# Patient Record
Sex: MALE | Race: WHITE | NOT HISPANIC OR LATINO | URBAN - METROPOLITAN AREA
[De-identification: names, ages, dates, MRNs, and addresses within clinical notes are randomized per-mention and may not be internally consistent; named-entity substitution may affect disease eponyms.]

---

## 2018-09-30 LAB — NONINV COLON CA DNA+OCC BLD SCRN STL QL: NEGATIVE

## 2019-03-26 ENCOUNTER — HISTORICAL (OUTPATIENT)
Dept: ADMINISTRATIVE | Facility: HOSPITAL | Age: 64
End: 2019-03-26

## 2019-03-26 LAB
ALBUMIN SERPL-MCNC: 4.3 G/DL (ref 3.6–4.8)
ALBUMIN/GLOB SERPL: 1.2 {RATIO} (ref 1.2–2.2)
ALP SERPL-CCNC: 88 IU/L (ref 39–117)
ALT SERPL-CCNC: 13 IU/L (ref 0–44)
AST SERPL-CCNC: 17 IU/L (ref 0–40)
BILIRUB SERPL-MCNC: 0.5 MG/DL (ref 0–1.2)
BUN SERPL-MCNC: 14 MG/DL (ref 8–27)
CALCIUM SERPL-MCNC: 9.8 MG/DL (ref 8.6–10.2)
CHLORIDE SERPL-SCNC: 102 MMOL/L (ref 96–106)
CO2 SERPL-SCNC: 26 MMOL/L (ref 20–29)
CREAT SERPL-MCNC: 1.2 MG/DL (ref 0.76–1.27)
CREAT/UREA NIT SERPL: 12 (ref 10–24)
GLOBULIN SER-MCNC: 3.7 G/DL (ref 1.5–4.5)
GLUCOSE SERPL-MCNC: 98 MG/DL (ref 65–99)
POTASSIUM SERPL-SCNC: 4.8 MMOL/L (ref 3.5–5.2)
PROT SERPL-MCNC: 8 G/DL (ref 6–8.5)
SODIUM SERPL-SCNC: 147 MMOL/L (ref 134–144)
URATE SERPL-MCNC: 5.1 MG/DL (ref 3.7–8.6)

## 2019-11-19 ENCOUNTER — HISTORICAL (OUTPATIENT)
Dept: ADMINISTRATIVE | Facility: HOSPITAL | Age: 64
End: 2019-11-19

## 2019-11-19 ENCOUNTER — HISTORICAL (OUTPATIENT)
Dept: LAB | Facility: HOSPITAL | Age: 64
End: 2019-11-19

## 2019-11-19 LAB
ALBUMIN SERPL-MCNC: 4.5 G/DL (ref 3.6–4.8)
ALBUMIN/GLOB SERPL: 1.5 {RATIO} (ref 1.2–2.2)
ALP SERPL-CCNC: 87 IU/L (ref 39–117)
ALT SERPL-CCNC: 14 IU/L (ref 0–44)
AST SERPL-CCNC: 18 IU/L (ref 0–40)
BASOPHILS # BLD AUTO: 0.1 X10E3/UL (ref 0–0.2)
BASOPHILS NFR BLD AUTO: 1 %
BILIRUB SERPL-MCNC: 0.5 MG/DL (ref 0–1.2)
BUN SERPL-MCNC: 16 MG/DL (ref 8–27)
CALCIUM SERPL-MCNC: 9.1 MG/DL (ref 8.6–10.2)
CHLORIDE SERPL-SCNC: 100 MMOL/L (ref 96–106)
CHOLEST SERPL-MCNC: 141 MG/DL (ref 100–199)
CHOLEST/HDLC SERPL: 3.4 RATIO (ref 0–5)
CO2 SERPL-SCNC: 23 MMOL/L (ref 20–29)
CREAT SERPL-MCNC: 1.33 MG/DL (ref 0.76–1.27)
CREAT/UREA NIT SERPL: 12 (ref 10–24)
DEPRECATED CALCIDIOL+CALCIFEROL SERPL-MC: 27.7 NG/ML (ref 30–100)
EOSINOPHIL # BLD AUTO: 0.2 X10E3/UL (ref 0–0.4)
EOSINOPHIL NFR BLD AUTO: 2 %
ERYTHROCYTE [DISTWIDTH] IN BLOOD BY AUTOMATED COUNT: 13.2 % (ref 12.3–15.4)
GLOBULIN SER-MCNC: 3 G/DL (ref 1.5–4.5)
GLUCOSE SERPL-MCNC: 97 MG/DL (ref 65–99)
HBA1C MFR BLD: 5.2 % (ref 4.8–5.6)
HCT VFR BLD AUTO: 46.3 % (ref 37.5–51)
HDLC SERPL-MCNC: 41 MG/DL
HGB BLD-MCNC: 15 G/DL (ref 13–17.7)
LDLC SERPL CALC-MCNC: 78 MG/DL (ref 0–99)
LYMPHOCYTES # BLD AUTO: 3.1 X10E3/UL (ref 0.7–3.1)
LYMPHOCYTES NFR BLD AUTO: 38 %
MCH RBC QN AUTO: 31.4 PG (ref 26.6–33)
MCHC RBC AUTO-ENTMCNC: 32.4 G/DL (ref 31.5–35.7)
MCV RBC AUTO: 97 FL (ref 79–97)
MONOCYTES # BLD AUTO: 0.4 X10E3/UL (ref 0.1–0.9)
MONOCYTES NFR BLD AUTO: 5 %
NEUTROPHILS # BLD AUTO: 4.3 X10E3/UL (ref 1.4–7)
NEUTROPHILS NFR BLD AUTO: 54 %
PLATELET # BLD AUTO: 183 X10E3/UL (ref 150–450)
POTASSIUM SERPL-SCNC: 4.5 MMOL/L (ref 3.5–5.2)
PROT SERPL-MCNC: 7.5 G/DL (ref 6–8.5)
PSA SERPL-MCNC: 1.6 NG/ML (ref 0–4)
RBC # BLD AUTO: 4.77 X10(6)/MCL (ref 4.14–5.8)
SODIUM SERPL-SCNC: 140 MMOL/L (ref 134–144)
TRIGL SERPL-MCNC: 110 MG/DL (ref 0–149)
TSH SERPL-ACNC: 3.18 MIU/ML (ref 0.45–4.5)
URATE SERPL-MCNC: 8.5 MG/DL (ref 3.7–8.6)
VLDLC SERPL CALC-MCNC: 22 MG/DL (ref 5–40)
WBC # SPEC AUTO: 8 X10E3/UL (ref 3.4–10.8)

## 2020-01-15 ENCOUNTER — HISTORICAL (OUTPATIENT)
Dept: ADMINISTRATIVE | Facility: HOSPITAL | Age: 65
End: 2020-01-15

## 2020-05-19 ENCOUNTER — HISTORICAL (OUTPATIENT)
Dept: ADMINISTRATIVE | Facility: HOSPITAL | Age: 65
End: 2020-05-19

## 2022-04-10 ENCOUNTER — HISTORICAL (OUTPATIENT)
Dept: ADMINISTRATIVE | Facility: HOSPITAL | Age: 67
End: 2022-04-10
Payer: COMMERCIAL

## 2022-04-27 VITALS
SYSTOLIC BLOOD PRESSURE: 134 MMHG | DIASTOLIC BLOOD PRESSURE: 78 MMHG | WEIGHT: 277.13 LBS | HEIGHT: 70 IN | OXYGEN SATURATION: 98 % | BODY MASS INDEX: 39.67 KG/M2

## 2022-07-05 ENCOUNTER — TELEPHONE (OUTPATIENT)
Dept: FAMILY MEDICINE | Facility: CLINIC | Age: 67
End: 2022-07-05

## 2022-07-05 NOTE — TELEPHONE ENCOUNTER
----- Message from Jolie Mariee sent at 7/5/2022  1:57 PM CDT -----  Regarding: Advice  Type:  Needs Medical Advice    Who Called: pt    Would the patient rather a call back or a response via [facility]? C/b  Best Call Back Number: [phone]  Additional Information: pt usually goes to see pcp with wife, appt's same day.  Wife has appt 7/13 but at the time pt was scheduled for an appt 7/27 @ 8:30.  Pt called to [facility] appt to go with wife but there is no scheduled appt for pt in system.  Next available appt isn't until August.  Please contact patient.

## 2022-07-12 ENCOUNTER — PATIENT OUTREACH (OUTPATIENT)
Dept: ADMINISTRATIVE | Facility: HOSPITAL | Age: 67
End: 2022-07-12
Payer: MEDICARE

## 2022-07-12 NOTE — PROGRESS NOTES
Population Health. Out Reach.  The following record(s)  below were uploaded for Health Maintenance .    9/30/18 NIRMAL

## 2022-07-26 RX ORDER — LORATADINE 10 MG/1
10 TABLET ORAL DAILY PRN
COMMUNITY
Start: 2021-10-04

## 2022-07-26 RX ORDER — FUROSEMIDE 40 MG/1
40 TABLET ORAL DAILY PRN
COMMUNITY
Start: 2022-05-29 | End: 2022-08-15

## 2022-07-26 RX ORDER — HYDROXYZINE HYDROCHLORIDE 25 MG/1
1 TABLET, FILM COATED ORAL DAILY
COMMUNITY
Start: 2022-07-06 | End: 2022-10-04

## 2022-07-26 RX ORDER — ATORVASTATIN CALCIUM 10 MG/1
10 TABLET, FILM COATED ORAL NIGHTLY
COMMUNITY
Start: 2022-04-23

## 2022-07-26 RX ORDER — FEBUXOSTAT 80 MG/1
80 TABLET, FILM COATED ORAL
COMMUNITY
Start: 2022-02-18

## 2022-07-26 RX ORDER — GABAPENTIN 100 MG/1
1 CAPSULE ORAL 3 TIMES DAILY PRN
COMMUNITY
Start: 2022-01-07 | End: 2022-09-14

## 2022-07-26 RX ORDER — DICLOFENAC SODIUM 75 MG/1
75 TABLET, DELAYED RELEASE ORAL 2 TIMES DAILY PRN
COMMUNITY
Start: 2022-01-25

## 2022-07-26 RX ORDER — PANTOPRAZOLE SODIUM 40 MG/1
40 TABLET, DELAYED RELEASE ORAL DAILY
COMMUNITY
Start: 2022-06-05

## 2022-07-26 RX ORDER — LEVOTHYROXINE SODIUM 25 UG/1
1 TABLET ORAL DAILY
COMMUNITY
Start: 2022-07-06 | End: 2023-04-03

## 2022-07-27 ENCOUNTER — OFFICE VISIT (OUTPATIENT)
Dept: FAMILY MEDICINE | Facility: CLINIC | Age: 67
End: 2022-07-27
Payer: COMMERCIAL

## 2022-07-27 VITALS
WEIGHT: 283 LBS | OXYGEN SATURATION: 98 % | TEMPERATURE: 98 F | DIASTOLIC BLOOD PRESSURE: 74 MMHG | RESPIRATION RATE: 18 BRPM | HEIGHT: 70 IN | SYSTOLIC BLOOD PRESSURE: 132 MMHG | BODY MASS INDEX: 40.52 KG/M2 | HEART RATE: 76 BPM

## 2022-07-27 DIAGNOSIS — E03.9 HYPOTHYROIDISM, UNSPECIFIED TYPE: ICD-10-CM

## 2022-07-27 DIAGNOSIS — N28.9 MILD RENAL INSUFFICIENCY: ICD-10-CM

## 2022-07-27 DIAGNOSIS — M10.9 GOUT, UNSPECIFIED CAUSE, UNSPECIFIED CHRONICITY, UNSPECIFIED SITE: Primary | ICD-10-CM

## 2022-07-27 DIAGNOSIS — Z11.59 ENCOUNTER FOR HEPATITIS C SCREENING TEST FOR LOW RISK PATIENT: ICD-10-CM

## 2022-07-27 DIAGNOSIS — E78.49 ESSENTIAL FAMILIAL HYPERLIPIDEMIA: ICD-10-CM

## 2022-07-27 DIAGNOSIS — E66.01 CLASS 3 SEVERE OBESITY DUE TO EXCESS CALORIES WITH SERIOUS COMORBIDITY AND BODY MASS INDEX (BMI) OF 40.0 TO 44.9 IN ADULT: ICD-10-CM

## 2022-07-27 PROCEDURE — 1126F PR PAIN SEVERITY QUANTIFIED, NO PAIN PRESENT: ICD-10-PCS | Mod: CPTII,,, | Performed by: FAMILY MEDICINE

## 2022-07-27 PROCEDURE — 99214 PR OFFICE/OUTPT VISIT, EST, LEVL IV, 30-39 MIN: ICD-10-PCS | Mod: ,,, | Performed by: FAMILY MEDICINE

## 2022-07-27 PROCEDURE — 3288F PR FALLS RISK ASSESSMENT DOCUMENTED: ICD-10-PCS | Mod: CPTII,,, | Performed by: FAMILY MEDICINE

## 2022-07-27 PROCEDURE — 3075F SYST BP GE 130 - 139MM HG: CPT | Mod: CPTII,,, | Performed by: FAMILY MEDICINE

## 2022-07-27 PROCEDURE — 3078F DIAST BP <80 MM HG: CPT | Mod: CPTII,,, | Performed by: FAMILY MEDICINE

## 2022-07-27 PROCEDURE — 3288F FALL RISK ASSESSMENT DOCD: CPT | Mod: CPTII,,, | Performed by: FAMILY MEDICINE

## 2022-07-27 PROCEDURE — 1101F PR PT FALLS ASSESS DOC 0-1 FALLS W/OUT INJ PAST YR: ICD-10-PCS | Mod: CPTII,,, | Performed by: FAMILY MEDICINE

## 2022-07-27 PROCEDURE — 3078F PR MOST RECENT DIASTOLIC BLOOD PRESSURE < 80 MM HG: ICD-10-PCS | Mod: CPTII,,, | Performed by: FAMILY MEDICINE

## 2022-07-27 PROCEDURE — 1159F PR MEDICATION LIST DOCUMENTED IN MEDICAL RECORD: ICD-10-PCS | Mod: CPTII,,, | Performed by: FAMILY MEDICINE

## 2022-07-27 PROCEDURE — 1160F RVW MEDS BY RX/DR IN RCRD: CPT | Mod: CPTII,,, | Performed by: FAMILY MEDICINE

## 2022-07-27 PROCEDURE — 1159F MED LIST DOCD IN RCRD: CPT | Mod: CPTII,,, | Performed by: FAMILY MEDICINE

## 2022-07-27 PROCEDURE — 99214 OFFICE O/P EST MOD 30 MIN: CPT | Mod: ,,, | Performed by: FAMILY MEDICINE

## 2022-07-27 PROCEDURE — 3008F PR BODY MASS INDEX (BMI) DOCUMENTED: ICD-10-PCS | Mod: CPTII,,, | Performed by: FAMILY MEDICINE

## 2022-07-27 PROCEDURE — 3008F BODY MASS INDEX DOCD: CPT | Mod: CPTII,,, | Performed by: FAMILY MEDICINE

## 2022-07-27 PROCEDURE — 1126F AMNT PAIN NOTED NONE PRSNT: CPT | Mod: CPTII,,, | Performed by: FAMILY MEDICINE

## 2022-07-27 PROCEDURE — 1160F PR REVIEW ALL MEDS BY PRESCRIBER/CLIN PHARMACIST DOCUMENTED: ICD-10-PCS | Mod: CPTII,,, | Performed by: FAMILY MEDICINE

## 2022-07-27 PROCEDURE — 1101F PT FALLS ASSESS-DOCD LE1/YR: CPT | Mod: CPTII,,, | Performed by: FAMILY MEDICINE

## 2022-07-27 PROCEDURE — 3075F PR MOST RECENT SYSTOLIC BLOOD PRESS GE 130-139MM HG: ICD-10-PCS | Mod: CPTII,,, | Performed by: FAMILY MEDICINE

## 2022-07-27 NOTE — PROGRESS NOTES
Patient ID: [id number]     Chief Complaint: Hyperlipidemia and Gout        HPI:     Isidoro Leal is a 67 y.o. male here today for a follow up.   Gout   Prn use of analgesics decreased. The course is unchanged. Compliance problems: not with diet, not with exercise program and with medications, admits to not taking Uloric daily, plans to get back on track. Associated symptoms characterized by no fever, decreased range of motion, joint redness, joint stiffness or joint warmth. He denies Rx side effects from Uloric. He is due for labs.  - Cholesterol is controlled with Rx, no side effects, he does followup with Cardiologist (Dr. Riojas). He is due for labs.  Hypothyroidism   Decreased symptoms. Side effects of medication unchanged. The course is improving. Compliance problems: not with medications, no side effects. Associated symptoms characterized by no fatigue, weight gain, hair loss, palpitations, constipation, joint pain, change in skin color, Altered mental status or tremor. He is due for labs.   - He is here for followup mild renal insufficiency, he reports that he takes a diuretic p.r.n. and he is doing well with lifestyle changes, not taking NSAIDs and has been increasing fluid intake. He is asymptomatic. He is due for labs today.    - He is seeing  (ENT) for dysphagia, had MRI neck done yesterday, was found to have Cervical DJD that is affecting esophageal motility, has an appointment scheduled with Dr. Rodriguez today to discuss treatment options.   - He is obese, he is not interested in surgical weight loss or nutrition referral, he would like to try Wegovy to help with weight loss. He denies family history of medullary thyroid cancer and personal history of pancreatitis.   - He would like Hep C screening.   - Patient is without any other complaints today.      ----------------------------  Hypercholesterolemia  Hypogonadism in male  Insomnia  Obesity, unspecified  Vitamin D deficiency     Past  Surgical History:   Procedure Laterality Date    APPENDECTOMY      SINUS SURGERY         Review of patient's allergies indicates:   Allergen Reactions    Montelukast Anaphylaxis    Clindamycin      Other reaction(s): Cutaneous eruption, Weal    Hydrocodone bitartrate     Levofloxacin     Penicillins     Propoxyphene n-acetaminophen        Outpatient Medications Marked as Taking for the 7/27/22 encounter (Office Visit) with Cara Fiore MD   Medication Sig Dispense Refill    atorvastatin (LIPITOR) 10 MG tablet Take 10 mg by mouth every evening.      diclofenac (VOLTAREN) 75 MG EC tablet Take 75 mg by mouth 2 (two) times daily as needed.      febuxostat (ULORIC) 80 mg Tab Take 80 mg by mouth once daily.      furosemide (LASIX) 40 MG tablet Take 40 mg by mouth daily as needed.      gabapentin (NEURONTIN) 100 MG capsule Take 1 capsule by mouth 3 (three) times daily as needed.      hydrOXYzine HCL (ATARAX) 25 MG tablet Take 1 tablet by mouth once daily.      levothyroxine (SYNTHROID) 25 MCG tablet Take 1 tablet by mouth once daily.      loratadine (CLARITIN) 10 mg tablet Take 10 mg by mouth daily as needed.      pantoprazole (PROTONIX) 40 MG tablet Take 40 mg by mouth once daily.         Social History     Socioeconomic History    Marital status:    Tobacco Use    Smoking status: Never Smoker    Smokeless tobacco: Never Used   Substance and Sexual Activity    Alcohol use: Never    Drug use: Never    Sexual activity: Yes        Family History   Problem Relation Age of Onset    Hypertension Mother     Stroke Father         Subjective:       Review of Systems:    See HPI for details    Constitutional: Denies Change in appetite. Denies Chills. Denies Fever. Denies Night sweats.  Eye: Denies Blurred vision. Denies Discharge. Denies Eye pain.  ENT: Denies Decreased hearing. Denies Sore throat. Denies Swollen glands.  Respiratory: Denies Cough. Denies Shortness of breath. Denies  "Shortness of breath with exertion. Denies Wheezing.  Cardiovascular: Denies Chest pain at rest. Denies Chest pain with exertion. Denies Irregular heartbeat. Denies Palpitations.  Gastrointestinal: Denies Abdominal pain. Denies Diarrhea. Denies Nausea. Denies Vomiting. Denies Hematemesis or Hematochezia.  Genitourinary: Denies Dysuria. Denies Urinary frequency. Denies Urinary urgency. Denies Blood in urine.  Endocrine: Denies Cold intolerance. Denies Excessive thirst. Denies Heat intolerance. Denies Weight loss. Denies Weight gain.  Musculoskeletal: Denies Painful joints. Denies Weakness.  Integumentary: Denies Rash. Denies Itching. Denies Dry skin.  Neurologic: Denies Dizziness. Denies Fainting. Denies Headache.  Psychiatric: Denies Depression. Denies Anxiety. Denies Suicidal/Homicidal ideations.    All Other ROS: Negative except as stated in HPI.       Objective:     /74 (BP Location: Right arm, Patient Position: Sitting, BP Method: Medium (Manual))   Pulse 76   Temp 98.2 °F (36.8 °C) (Oral)   Resp 18   Ht 5' 10" (1.778 m)   Wt 128.4 kg (283 lb)   SpO2 98%   BMI 40.61 kg/m²     Physical Exam    General: Alert and oriented, No acute distress.   Appearance: Obese.   Eye: Pupils are equal, round and reactive to light, Normal conjunctiva.   HENT: Normocephalic, Tympanic membranes are clear, Normal hearing, Oral mucosa is moist, No pharyngeal erythema.   Throat: Pharynx ( Not edematous, No exudate ).   Neck: Supple, Non-tender, No carotid bruit, No lymphadenopathy, No thyromegaly.   Respiratory: Lungs are clear to auscultation, Respirations are non-labored, Breath sounds are equal, Symmetrical chest wall expansion, No chest wall tenderness.   Cardiovascular: Normal rate, Regular rhythm, No murmur, Good pulses equal in all extremities, No edema.   Gastrointestinal: Soft, Non-tender, Non-distended, Normal bowel sounds, No organomegaly.   Genitourinary: No costovertebral angle tenderness.   Musculoskeletal: " Normal range of motion, Normal gait. No tenderness.   Neurologic: No focal deficits, Cranial Nerves II-XII are grossly intact.   Psychiatric: Cooperative, Appropriate mood & affect, Normal judgment, Non-suicidal.   Mood and affect: Calm.   Behavior: Relaxed.         Assessment:       ICD-10-CM ICD-9-CM   1. Gout, unspecified cause, unspecified chronicity, unspecified site  M10.9 274.9   2. Essential familial hyperlipidemia  E78.49 272.2   3. Hypothyroidism, unspecified type  E03.9 244.9   4. Mild renal insufficiency  N28.9 593.9   5. Class 3 severe obesity due to excess calories with serious comorbidity and body mass index (BMI) of 40.0 to 44.9 in adult  E66.01 278.01    Z68.41 V85.41   6. Encounter for hepatitis C screening test for low risk patient  Z11.59 V73.89        Plan:     Problem List Items Addressed This Visit    None     Visit Diagnoses     Gout, unspecified cause, unspecified chronicity, unspecified site    -  Primary    Relevant Orders    Uric Acid    Essential familial hyperlipidemia        Relevant Orders    Lipid Panel    Comprehensive Metabolic Panel    CK    Lipid Panel    Hypothyroidism, unspecified type        Relevant Orders    TSH    T4, Free    Mild renal insufficiency        Relevant Orders    CBC Auto Differential    Comprehensive Metabolic Panel    Urinalysis, Reflex to Urine Culture Urine, Clean Catch    Class 3 severe obesity due to excess calories with serious comorbidity and body mass index (BMI) of 40.0 to 44.9 in adult        Relevant Medications    semaglutide, weight loss, 0.25 mg/0.5 mL PnIj    Encounter for hepatitis C screening test for low risk patient        Relevant Orders    Hepatitis C Antibody       1. Gout, unspecified cause, unspecified chronicity, unspecified site  - Uric Acid; Future  - Will treat pending results. Continue gout diet and Uloric as prescribed for now.    2. Essential familial hyperlipidemia  - Lipid Panel; Future  - Comprehensive Metabolic Panel;  Future  - CK; Future  - Lipid Panel; Future  - Continue lifestyle modifications and Rx as prescribed. Will titrate Rx pending results. Continue followup with Cardiology as scheduled.   Continue  Stressed importance of dietary modifications. Follow a low cholesterol, low saturated fat diet with less that 200mg of cholesterol a day.  Avoid fried foods and high saturated fats (high saturated fats less than 7% of calories).  Add Flax Seed/Fish Oil supplements to diet. Increase dietary fiber.  Regular exercise can reduce LDL and raise HDL. Stressed importance of physical activity 5 times per week for 30 minutes per day.     3. Hypothyroidism, unspecified type  - TSH; Future  - T4, Free; Future  - Continue Rx as prescribed for now, will titrate Rx pending results.    4. Mild renal insufficiency  - CBC Auto Differential; Future  - Comprehensive Metabolic Panel; Future  - Urinalysis, Reflex to Urine Culture Urine, Clean Catch; Future  - Could be due to dehydration, needs to increase fluid intake, especially when taking diuretics, and avoid NSAIDs, recheck CMP and treat pending results.     5. Class 3 severe obesity due to excess calories with serious comorbidity and body mass index (BMI) of 40.0 to 44.9 in adult  - semaglutide, weight loss, 0.25 mg/0.5 mL PnIj; Inject 0.25 mg into the skin every 7 days.  Dispense: 2 mL; Refill: 1  - Rx trial of Wegovy with close monitoring, will titrate Rx pending results. Notify M.D. or ER if symptoms persist or worsen, pancreatitis, temp >100.4, or any acute illness.   -Body mass index is 40.61 kg/m².  Goal BMI <30.  Exercise 5 times a week for 30 minutes per day.  Avoid soda, simple sugars, excessive rice, potatoes or bread. Limit fast foods and fried foods.  Choose complex carbs in moderation (example: green vegetables, beans, oatmeal). Eat plenty of fresh fruits and vegetables with lean meats daily.  Do not skip meals. Eat a balanced portion size.  Avoid fad diets. Consider permanent  healthy life style changes.     6. Encounter for hepatitis C screening test for low risk patient  - Hepatitis C Antibody; Future        Isidoro was seen today for hyperlipidemia and gout.    Diagnoses and all orders for this visit:    Gout, unspecified cause, unspecified chronicity, unspecified site  -     Uric Acid; Future    Essential familial hyperlipidemia  -     Lipid Panel; Future  -     Comprehensive Metabolic Panel; Future  -     CK; Future  -     Lipid Panel; Future    Hypothyroidism, unspecified type  -     TSH; Future  -     T4, Free; Future    Mild renal insufficiency  -     CBC Auto Differential; Future  -     Comprehensive Metabolic Panel; Future  -     Urinalysis, Reflex to Urine Culture Urine, Clean Catch; Future    Class 3 severe obesity due to excess calories with serious comorbidity and body mass index (BMI) of 40.0 to 44.9 in adult  -     semaglutide, weight loss, 0.25 mg/0.5 mL PnIj; Inject 0.25 mg into the skin every 7 days.    Encounter for hepatitis C screening test for low risk patient  -     Hepatitis C Antibody; Future          Medication List with Changes/Refills   New Medications    SEMAGLUTIDE, WEIGHT LOSS, 0.25 MG/0.5 ML PNIJ    Inject 0.25 mg into the skin every 7 days.       Start Date: 7/27/2022 End Date: 9/25/2022   Current Medications    ATORVASTATIN (LIPITOR) 10 MG TABLET    Take 10 mg by mouth every evening.       Start Date: 4/23/2022 End Date: --    DICLOFENAC (VOLTAREN) 75 MG EC TABLET    Take 75 mg by mouth 2 (two) times daily as needed.       Start Date: 1/25/2022 End Date: --    FEBUXOSTAT (ULORIC) 80 MG TAB    Take 80 mg by mouth once daily.       Start Date: 2/18/2022 End Date: --    FUROSEMIDE (LASIX) 40 MG TABLET    Take 40 mg by mouth daily as needed.       Start Date: 5/29/2022 End Date: --    GABAPENTIN (NEURONTIN) 100 MG CAPSULE    Take 1 capsule by mouth 3 (three) times daily as needed.       Start Date: 1/7/2022  End Date: --    HYDROXYZINE HCL (ATARAX) 25 MG  TABLET    Take 1 tablet by mouth once daily.       Start Date: 7/6/2022  End Date: --    LEVOTHYROXINE (SYNTHROID) 25 MCG TABLET    Take 1 tablet by mouth once daily.       Start Date: 7/6/2022  End Date: --    LORATADINE (CLARITIN) 10 MG TABLET    Take 10 mg by mouth daily as needed.       Start Date: 10/4/2021 End Date: --    PANTOPRAZOLE (PROTONIX) 40 MG TABLET    Take 40 mg by mouth once daily.       Start Date: 6/5/2022  End Date: --          Follow up in about 6 months (around 1/27/2023) for Wellness.

## 2022-08-04 ENCOUNTER — DOCUMENTATION ONLY (OUTPATIENT)
Dept: FAMILY MEDICINE | Facility: CLINIC | Age: 67
End: 2022-08-04
Payer: COMMERCIAL

## 2022-08-04 DIAGNOSIS — E78.49 ESSENTIAL FAMILIAL HYPERLIPIDEMIA: Primary | ICD-10-CM

## 2022-11-15 ENCOUNTER — PATIENT MESSAGE (OUTPATIENT)
Dept: FAMILY MEDICINE | Facility: CLINIC | Age: 67
End: 2022-11-15
Payer: MEDICARE

## 2022-12-08 ENCOUNTER — OFFICE VISIT (OUTPATIENT)
Dept: URGENT CARE | Facility: CLINIC | Age: 67
End: 2022-12-08
Payer: MEDICARE

## 2022-12-08 VITALS
HEIGHT: 74 IN | SYSTOLIC BLOOD PRESSURE: 148 MMHG | WEIGHT: 280 LBS | OXYGEN SATURATION: 98 % | RESPIRATION RATE: 20 BRPM | HEART RATE: 63 BPM | DIASTOLIC BLOOD PRESSURE: 84 MMHG | TEMPERATURE: 98 F | BODY MASS INDEX: 35.94 KG/M2

## 2022-12-08 DIAGNOSIS — R68.83 CHILLS: ICD-10-CM

## 2022-12-08 DIAGNOSIS — R05.9 COUGH, UNSPECIFIED TYPE: ICD-10-CM

## 2022-12-08 DIAGNOSIS — J02.9 SORE THROAT: ICD-10-CM

## 2022-12-08 DIAGNOSIS — H66.91 RIGHT OTITIS MEDIA, UNSPECIFIED OTITIS MEDIA TYPE: Primary | ICD-10-CM

## 2022-12-08 LAB
CTP QC/QA: YES
MOLECULAR STREP A: NEGATIVE
POC MOLECULAR INFLUENZA A AGN: NEGATIVE
POC MOLECULAR INFLUENZA B AGN: NEGATIVE
SARS-COV-2 RDRP RESP QL NAA+PROBE: NEGATIVE

## 2022-12-08 PROCEDURE — 99213 OFFICE O/P EST LOW 20 MIN: CPT | Mod: ,,, | Performed by: FAMILY MEDICINE

## 2022-12-08 PROCEDURE — 87651 POCT STREP A MOLECULAR: ICD-10-PCS | Mod: QW,,, | Performed by: FAMILY MEDICINE

## 2022-12-08 PROCEDURE — 87651 STREP A DNA AMP PROBE: CPT | Mod: QW,,, | Performed by: FAMILY MEDICINE

## 2022-12-08 PROCEDURE — 87635 SARS-COV-2 COVID-19 AMP PRB: CPT | Mod: QW,CR,, | Performed by: FAMILY MEDICINE

## 2022-12-08 PROCEDURE — 87502 POCT INFLUENZA A/B MOLECULAR: ICD-10-PCS | Mod: QW,,, | Performed by: FAMILY MEDICINE

## 2022-12-08 PROCEDURE — 87502 INFLUENZA DNA AMP PROBE: CPT | Mod: QW,,, | Performed by: FAMILY MEDICINE

## 2022-12-08 PROCEDURE — 99213 PR OFFICE/OUTPT VISIT, EST, LEVL III, 20-29 MIN: ICD-10-PCS | Mod: ,,, | Performed by: FAMILY MEDICINE

## 2022-12-08 PROCEDURE — 87635: ICD-10-PCS | Mod: QW,CR,, | Performed by: FAMILY MEDICINE

## 2022-12-08 RX ORDER — GUAIFENESIN 600 MG/1
600 TABLET, EXTENDED RELEASE ORAL
COMMUNITY

## 2022-12-08 RX ORDER — HYDROCHLOROTHIAZIDE 12.5 MG/1
12.5 CAPSULE ORAL
COMMUNITY
Start: 2022-09-27

## 2022-12-08 RX ORDER — CIPROFLOXACIN 500 MG/1
500 TABLET ORAL 2 TIMES DAILY
COMMUNITY
Start: 2022-11-27

## 2022-12-08 RX ORDER — DOXYCYCLINE HYCLATE 100 MG
100 TABLET ORAL 2 TIMES DAILY
Qty: 14 TABLET | Refills: 0 | Status: SHIPPED | OUTPATIENT
Start: 2022-12-08 | End: 2022-12-15

## 2022-12-08 NOTE — PATIENT INSTRUCTIONS
Flonase and saline nasal spray twice a day, antihistamine at bedtime.  Force fluids.  Doxy with food. Cough may linger a few weeks but should not have fever, chest pain, or shortness of breath.

## 2022-12-08 NOTE — PROGRESS NOTES
"Subjective:       Patient ID: Isidoro Leal is a 67 y.o. male.    Vitals:  height is 6' 2" (1.88 m) and weight is 127 kg (280 lb). His temperature is 97.8 °F (36.6 °C). His blood pressure is 148/84 (abnormal) and his pulse is 63. His respiration is 20 and oxygen saturation is 98%.     Chief Complaint: Otalgia (Started a wk ago,  rt ear, congestion, cough, runny nose, ST, HA, BA )    About 1 week of cough, congestion and otitis.  Slight pharyngitis.  Main concern is otitis.  No shortness of breath or chest pain.  No recent fever.      Constitution: Negative for fever.   HENT:  Positive for congestion, postnasal drip, sinus pressure and sore throat.    Cardiovascular:  Negative for chest pain and palpitations.   Respiratory:  Positive for cough. Negative for chest tightness and shortness of breath.    Gastrointestinal:  Negative for nausea and vomiting.     Objective:      Physical Exam   Constitutional: He is oriented to person, place, and time. He appears well-developed. No distress.   HENT:   Head: Normocephalic.   Ears:   Right Ear: External ear normal.   Left Ear: Tympanic membrane and external ear normal.      Comments: R TM with erythema  Nose: Rhinorrhea present.   Mouth/Throat: Uvula is midline and mucous membranes are normal. No uvula swelling. Cobblestoning present. No oropharyngeal exudate or posterior oropharyngeal edema. Tonsils are 0 on the right. Tonsils are 0 on the left. No tonsillar exudate.   Eyes: Pupils are equal, round, and reactive to light. Right eye exhibits no discharge. Left eye exhibits no discharge.   Neck: Neck supple. No tracheal deviation present.   Cardiovascular: Normal rate, regular rhythm and normal heart sounds.   No murmur heard.  Pulmonary/Chest: Effort normal and breath sounds normal. No stridor. No respiratory distress. He has no wheezes.   Lymphadenopathy:     He has no cervical adenopathy.   Neurological: no focal deficit. He is alert and oriented to person, place, and " time.   Skin: Skin is warm and dry. Capillary refill takes less than 2 seconds.   Psychiatric: Mood and thought content normal.   Nursing note and vitals reviewed.      Assessment:       1. Right otitis media, unspecified otitis media type    2. Cough, unspecified type    3. Chills    4. Sore throat            Plan:         Right otitis media, unspecified otitis media type  -     doxycycline (VIBRA-TABS) 100 MG tablet; Take 1 tablet (100 mg total) by mouth 2 (two) times daily. for 7 days  Dispense: 14 tablet; Refill: 0    Cough, unspecified type  -     POCT COVID-19 Rapid Screening    Chills  -     POCT Influenza A/B MOLECULAR    Sore throat  -     POCT Strep A, Molecular          Strep, Flu and COVID negative.

## 2022-12-13 ENCOUNTER — PATIENT MESSAGE (OUTPATIENT)
Dept: RESEARCH | Facility: HOSPITAL | Age: 67
End: 2022-12-13
Payer: MEDICARE

## 2023-02-02 ENCOUNTER — OFFICE VISIT (OUTPATIENT)
Dept: FAMILY MEDICINE | Facility: CLINIC | Age: 68
End: 2023-02-02
Payer: MEDICARE

## 2023-02-02 VITALS
DIASTOLIC BLOOD PRESSURE: 85 MMHG | SYSTOLIC BLOOD PRESSURE: 139 MMHG | WEIGHT: 290.69 LBS | OXYGEN SATURATION: 99 % | BODY MASS INDEX: 37.32 KG/M2 | HEART RATE: 72 BPM

## 2023-02-02 DIAGNOSIS — Z12.5 SCREENING PSA (PROSTATE SPECIFIC ANTIGEN): ICD-10-CM

## 2023-02-02 DIAGNOSIS — M10.9 GOUT, UNSPECIFIED CAUSE, UNSPECIFIED CHRONICITY, UNSPECIFIED SITE: ICD-10-CM

## 2023-02-02 DIAGNOSIS — E78.49 ESSENTIAL FAMILIAL HYPERLIPIDEMIA: ICD-10-CM

## 2023-02-02 DIAGNOSIS — E88.819 INSULIN RESISTANCE: ICD-10-CM

## 2023-02-02 DIAGNOSIS — R79.9 ABNORMAL FINDING OF BLOOD CHEMISTRY, UNSPECIFIED: ICD-10-CM

## 2023-02-02 DIAGNOSIS — I10 PRIMARY HYPERTENSION: ICD-10-CM

## 2023-02-02 DIAGNOSIS — E03.9 HYPOTHYROIDISM, UNSPECIFIED TYPE: ICD-10-CM

## 2023-02-02 DIAGNOSIS — Z11.59 ENCOUNTER FOR HEPATITIS C SCREENING TEST FOR LOW RISK PATIENT: ICD-10-CM

## 2023-02-02 DIAGNOSIS — E66.01 CLASS 2 SEVERE OBESITY DUE TO EXCESS CALORIES WITH SERIOUS COMORBIDITY AND BODY MASS INDEX (BMI) OF 37.0 TO 37.9 IN ADULT: ICD-10-CM

## 2023-02-02 DIAGNOSIS — E29.1 MALE HYPOGONADISM: ICD-10-CM

## 2023-02-02 DIAGNOSIS — Z00.00 MEDICARE ANNUAL WELLNESS VISIT, SUBSEQUENT: Primary | ICD-10-CM

## 2023-02-02 DIAGNOSIS — Z12.11 SCREENING FOR COLON CANCER: ICD-10-CM

## 2023-02-02 PROCEDURE — 99213 OFFICE O/P EST LOW 20 MIN: CPT | Mod: 25,,, | Performed by: FAMILY MEDICINE

## 2023-02-02 PROCEDURE — G0439 PR MEDICARE ANNUAL WELLNESS SUBSEQUENT VISIT: ICD-10-PCS | Mod: ,,, | Performed by: FAMILY MEDICINE

## 2023-02-02 PROCEDURE — G0439 PPPS, SUBSEQ VISIT: HCPCS | Mod: ,,, | Performed by: FAMILY MEDICINE

## 2023-02-02 PROCEDURE — 99213 PR OFFICE/OUTPT VISIT, EST, LEVL III, 20-29 MIN: ICD-10-PCS | Mod: 25,,, | Performed by: FAMILY MEDICINE

## 2023-02-02 RX ORDER — SEMAGLUTIDE 1.34 MG/ML
0.25 INJECTION, SOLUTION SUBCUTANEOUS
Qty: 1 PEN | Refills: 5 | Status: SHIPPED | OUTPATIENT
Start: 2023-02-02 | End: 2023-03-02

## 2023-02-02 NOTE — PROGRESS NOTES
Patient ID: [id number]     Chief Complaint: Medicare AWV        HPI:     Isidoro Leal is a 67 y.o. male here today for a Medicare Wellness.   Well Adult History   The patient presents for well adult exam, The patient's general health status is described as good. The patient's diet is described as balanced. Exercise: occasional. Associated symptoms consist of denies weight loss, denies weight gain, denies fatigue, denies headache, denies hearing loss and denies vision changes. Additional pertinent history: last dental exam: 01/2023 (with Dr. Ross, Dr. Ramírez Man), last eye exam: 02/2022, with [facility], last Cologuard: 09/30/2018 (Negative), needs Cologuard ordered, seat belt use, occasional caffeine use (coffee), tobacco use none, no alcohol use and He needs his orders for labs today. He takes MVI daily. He is UTD on all vaccines. Patient also reports history of male hypogonadism/low testosterone level that is followed by Urology,asymptomatic. He also has a history of gout that is controlled with Uloric, he denies adverse Rx side effects. HTN and hypercholesterolemia are well controlled with current treatment regimen. Hypothyroidism is controlled with Rx, no side effects, asymptomatic. He does see Cardiology (Dr. Riojas) as scheduled. He does followup with Spine Ortho (Dr. Jiménez) as scheduled. He does followup with ENT (Dr. Heladio Rodriguez) as scheduled. He does followup with Dermatology (Dr. Arora) for skin care.   - He is obese with insulin resistance, he is not interested in surgical weight loss or nutrition referral, he tried to get Wegovy, but couldn't find the Rx anywhere, he is open to trying Ozempic. He denies family history of medullary thyroid cancer or MEN II and personal history of pancreatitis.   - Patient is without any other complaints today.    denies Urinary leakage.  denies Recent falls or balance difficulty.   admits to Daily exercise or physical activity, consist of walking.  denies  Depression, stress, anxiety, or emotional lability.   denies The need for healthcare treatment including a cane/walker, blood pressure monitoring (he has monitor at home), or regular vision/hearing tests.       A separate E/M code has been provided to evaluate additional complaints that the patient would like addressed during the dedicated Medicare Wellness Exam.    Patient Care Team:  Cara Fiore MD as PCP - General (Family Medicine)     Opioid Screening: Patient medication list reviewed, patient is not taking prescription opioids. Patient is not using additional opioids than prescribed. Patient is at low risk of substance abuse based on this opioid use history.      Advance Care Planning     Date: 02/02/2023    Power of   I initiated the process of advance care planning today and explained the importance of this process to the patient.  I introduced the concept of advance directives to the patient, as well. Then the patient received detailed information about the importance of designating a Health Care Power of  (HCPOA). He was also instructed to communicate with this person about their wishes for future healthcare, should he become sick and lose decision-making capacity. The patient has previously appointed a HCPOA. After our discussion, the patient has decided to complete a HCPOA and has appointed his daughter, health care agent:  Stephany Leal  & health care agent number:  [phone]  I spent a total time of 5 minutes discussing this issue with the patient.           ----------------------------  Allergy  Hypercholesterolemia  Hypogonadism in male  Insomnia  Obesity, unspecified  Vitamin D deficiency     Past Surgical History:   Procedure Laterality Date    APPENDECTOMY      SINUS SURGERY         Review of patient's allergies indicates:   Allergen Reactions    Montelukast Anaphylaxis    Clindamycin      Other reaction(s): Cutaneous eruption, Weal    Hydrocodone bitartrate     Levofloxacin      Penicillins     Propoxyphene n-acetaminophen        Outpatient Medications Marked as Taking for the 2/2/23 encounter (Office Visit) with Cara Fiore MD   Medication Sig Dispense Refill    atorvastatin (LIPITOR) 10 MG tablet Take 10 mg by mouth every evening.      diclofenac (VOLTAREN) 75 MG EC tablet Take 75 mg by mouth 2 (two) times daily as needed.      febuxostat (ULORIC) 80 mg Tab Take 80 mg by mouth as needed.      furosemide (LASIX) 40 MG tablet TAKE 1 TABLET BY MOUTH EVERY DAY AS NEEDED FOR SWELLING (Patient taking differently: Take 40 mg by mouth as needed.) 90 tablet 3    gabapentin (NEURONTIN) 100 MG capsule TAKE 1 CAPSULE BY MOUTH THREE TIMES A DAY AS NEEDED FOR NEUROPATHIC PAIN (Patient taking differently: Take 100 mg by mouth as needed.) 90 capsule 5    guaiFENesin (MUCINEX) 600 mg 12 hr tablet 600 mg as needed.      hydroCHLOROthiazide (MICROZIDE) 12.5 mg capsule Take 12.5 mg by mouth.      hydrOXYzine HCL (ATARAX) 25 MG tablet TAKE 1 TABLET BY MOUTH EVERY DAY AT BEDTIME AS NEEDED FOR INSOMNIA 90 tablet 3    levothyroxine (SYNTHROID) 25 MCG tablet Take 1 tablet by mouth once daily.      loratadine (CLARITIN) 10 mg tablet Take 10 mg by mouth daily as needed.      pantoprazole (PROTONIX) 40 MG tablet Take 40 mg by mouth once daily.         Social History     Socioeconomic History    Marital status:    Tobacco Use    Smoking status: Never    Smokeless tobacco: Never   Substance and Sexual Activity    Alcohol use: Never    Drug use: Never    Sexual activity: Yes        Family History   Problem Relation Age of Onset    Hypertension Mother     Stroke Father     Hyperlipidemia Brother     Hyperlipidemia Brother     Hyperlipidemia Brother     Hyperlipidemia Brother         Subjective:     ROS      See HPI for details    Constitutional: No fever, No chills, No fatigue.   Eye: No blurring, No visual disturbances.   Ear/Nose/Mouth/Throat: No decreased hearing, No ear pain, No nasal  congestion, No sore throat.   Respiratory: No shortness of breath, No cough, No wheezing.   Cardiovascular: No chest pain, No palpitations, No peripheral edema.   Gastrointestinal: As per HPI; No nausea, No vomiting, No diarrhea, No constipation, No abdominal pain.   Genitourinary: No dysuria, No hematuria.   Hematology/Lymphatics: No bruising tendency, No bleeding tendency, No swollen lymph glands.   Endocrine: No excessive thirst, No polyuria, No excessive hunger.   Musculoskeletal: Joint pain, No muscle pain, No decreased range of motion.   Integumentary: No rash, No pruritus.   Neurologic: No abnormal balance, No confusion, No headache.   Psychiatric: No sleeping problems, No anxiety, No depression, Not suicidal, No hallucinations.     All Other ROS: Negative except as stated in HPI.       Objective:     /85 (BP Location: Left arm, Patient Position: Sitting, BP Method: Medium (Manual))   Pulse 72   Wt 131.9 kg (290 lb 11.2 oz)   SpO2 99%   BMI 37.32 kg/m²     Physical Exam    General: Alert and oriented, No acute distress.   Appearance: Obese.   Eye: Pupils are equal, round and reactive to light, Normal conjunctiva.   HENT: Normocephalic, Tympanic membranes are clear, Normal hearing, Oral mucosa is moist, No pharyngeal erythema.   Throat: Pharynx ( Not edematous, No exudate ).   Neck: Supple, Non-tender, No carotid bruit, No lymphadenopathy, No thyromegaly.   Respiratory: Lungs are clear to auscultation, Respirations are non-labored, Breath sounds are equal, Symmetrical chest wall expansion, No chest wall tenderness.   Cardiovascular: Normal rate, Regular rhythm, No murmur, Good pulses equal in all extremities, No edema.   Gastrointestinal: Soft, Non-tender, Non-distended, Normal bowel sounds, No organomegaly.   Genitourinary: No costovertebral angle tenderness.   Musculoskeletal: Normal range of motion, Normal gait. Right lateral elbow with FROM, mild TTP, no erythema, no effusion, no deformity.    Neurologic: No focal deficits, Cranial Nerves II-XII are grossly intact.   Psychiatric: Cooperative, Appropriate mood & affect, Normal judgment, Non-suicidal.   Mood and affect: Calm.   Behavior: Relaxed.       Assessment:       ICD-10-CM ICD-9-CM   1. Medicare annual wellness visit, subsequent  Z00.00 V70.0   2. Screening PSA (prostate specific antigen)  Z12.5 V76.44   3. Screening for colon cancer  Z12.11 V76.51   4. Primary hypertension  I10 401.9   5. Essential familial hyperlipidemia  E78.49 272.2   6. Gout, unspecified cause, unspecified chronicity, unspecified site  M10.9 274.9   7. Hypothyroidism, unspecified type  E03.9 244.9   8. Encounter for hepatitis C screening test for low risk patient  Z11.59 V73.89   9. Insulin resistance  E88.81 277.7   10. Class 2 severe obesity due to excess calories with serious comorbidity and body mass index (BMI) of 37.0 to 37.9 in adult  E66.01 278.01    Z68.37 V85.37   11. Male hypogonadism  E29.1 257.2   12. Abnormal finding of blood chemistry, unspecified  R79.9 790.6        Plan:       Medicare Annual Wellness and Personalized Prevention Plan:     Fall Risk + Home Safety + Hearing Impairment + Depression Screen + Cognitive Impairment Screen + Health Risk Assessment all reviewed.     No flowsheet data found.  Depression Screeening PHQ2 2/2/2023 7/27/2022   Over the last two weeks how often have you been bothered by little interest or pleasure in doing things 0 0   Over the last two weeks how often have you been bothered by feeling down, depressed or hopeless 0 0   PHQ-2 Total Score 0 0     Fall Risk Assessment - Outpatient 7/27/2022   Mobility Status Ambulatory   Number of falls 0   Identified as fall risk 0             What [age] 65-69  Are you male or female?: Female  During the past four weeks, how much have you been bothered by emotional problems such as feeling anxious, depressed, irritable, sad, or downhearted and blue?: Not at all  During the past five  weeks, has your physical and/or emotional health limited your social activities with family, friends, neighbors, or groups?: Not at all  During the past four weeks, how much bodily pain have you generally had?: Very mild pain  During the past four weeks, was someone available to help if you needed and wanted help?: Yes, as much as I wanted  During the past four weeks, what was the hardest physical activity you could do for at least two minutes?: Heavy  Can you get to places out of walking distance without help?  (For example, can you travel alone on buses or taxis, or drive your own car?): Yes  Can you go shopping for groceries or clothes without someone's help?: Yes  Can you prepare your own meals?: Yes  Can you do your own housework without help?: Yes  Because of any health problems, do you need the help of another person with your personal care needs such as eating, bathing, dressing, or getting around the house?: No  Can you handle your own money without help?: Yes  During the past four weeks, how would you rate your health in general?: Very good  How have things been going for you during the past four weeks?: Pretty well  Are you having difficulties driving your car?: No  Do you always fasten your seat belt when you are in a car?: Yes, usually  How often in the past four weeks have you been bothered by falling or dizzy when standing up?: Never  How often in the past four weeks have you been bothered by sexual problems?: Never  How often in the past four weeks have you been bothered by trouble eating well?: Never  How often in the past four weeks have you been bothered by teeth or denture problems?: Never  How often in the past four weeks have you been bothered with problems using the telephone?: Never  How often in the past four weeks have you been bothered by tiredness or fatigue?: Never  Have you fallen two or more times in the past year?: No  Are you afraid of falling?: No  Are you a smoker?: No  During the  past four weeks, how many drinks of wine, beer, or other alcoholic beverages did you have?: One drink or less per week  Do you exercise for about 20 minutes three or more days a week?: Yes, some of the time  Have you been given any information to help you with hazards in your house that might hurt you?: Yes  Have you been given any information to help you with keeping track of your medications?: Yes  How often do you have trouble taking medicines the way you've been told to take them?: I always take them as prescribed  How confident are you that you can control and manage most of your health problems?: Very confident  What is your race? (Check all that apply.):                  Alcohol/Tobacco Use - Stressed importance of smoking cessation and limiting alcohol intake.  CVD Risk Factors - Reviewed  Obesity/Physical Activity - Normal BMI. Encouraged daily 30 minute physical activity x 5 days per week.      Health Maintenance Topics with due status: Not Due       Topic Last Completion Date    TETANUS VACCINE 09/19/2018    Lipid Panel 08/01/2022        Vaccinations -   Immunization History   Administered Date(s) Administered    COVID-19, MRNA, LN-S, PF (Pfizer) (Gray Cap) 04/05/2022    COVID-19, MRNA, LN-S, PF (Pfizer) (Purple Cap) 02/26/2021, 03/19/2021, 09/29/2021    Influenza - Quadrivalent - High Dose - PF (65 years and older) 09/26/2021    Influenza - Quadrivalent - PF *Preferred* (6 months and older) 11/05/2014, 02/08/2018    Influenza - Trivalent - PF (ADULT) 11/05/2014, 10/17/2016, 02/08/2018, 09/19/2018, 11/19/2019, 09/23/2020, 09/26/2021    Pneumococcal Conjugate - 13 Valent 05/14/2020    Pneumococcal Polysaccharide - 23 Valent 07/23/2021    Tdap 09/19/2018    Zoster Recombinant 01/22/2021, 07/23/2021          1. Medicare annual wellness visit, subsequent  - Comprehensive Metabolic Panel; Future  - Hemoglobin A1C; Future  - TSH; Future  - Urinalysis, Reflex to Urine Culture; Future  - Will treat  pending lab results. Diet, exercise, and 10% weight loss encouraged. Keep appointment for dental exams x q6 months as scheduled. Keep appointment for annual eye exam as scheduled. Keep appointment with specialists as scheduled. Notify M.D. or ER if temp greater than 100.4, or any acute illness.      2. Screening PSA (prostate specific antigen)  - PSA, Screening; Future    3. Screening for colon cancer  - Cologuard Screening (Multitarget Stool DNA); Future  - Cologuard Screening (Multitarget Stool DNA)    4. Primary hypertension  - CBC Auto Differential; Future  - BP is well controlled. Continue BP Rx as prescribed. Keep daily BP log. Continue followup with Cardiology as scheduled. Notify M.D. or ER if BP >170/100 or <90/60, chest pain, palpitations, headache, SOB, temp greater than 100.4, or any acute illness.   Continue  Low Sodium Diet (DASH Diet - Less than 2 grams of sodium per day).  Monitor blood pressure daily and log. Report consistent numbers greater than 140/90.  Smoking cessation encouraged to aid in BP reduction.  Maintain healthy weight with goal BMI <30. Exercise 30 minutes per day, 5 days per week.      5. Essential familial hyperlipidemia  - CK; Future  - Lipid Panel; Future  - Continue Rx as prescribed.   Continue  Stressed importance of dietary modifications. Follow a low cholesterol, low saturated fat diet with less that 200mg of cholesterol a day.  Avoid fried foods and high saturated fats (high saturated fats less than 7% of calories).  Add Flax Seed/Fish Oil supplements to diet. Increase dietary fiber.  Regular exercise can reduce LDL and raise HDL. Stressed importance of physical activity 5 times per week for 30 minutes per day.      6. Gout, unspecified cause, unspecified chronicity, unspecified site  - Uric Acid; Future  - Will titrate Rx pending results. Gout dietary precautions encouraged. Notify M.D. or ER if symptoms persist or worsen, gout, temp >100.4, or any acute illness.      7.  Hypothyroidism, unspecified type  - T4, Free; Future  - Continue Rx as prescribed for now, will titrate Rx pending results.     8. Encounter for hepatitis C screening test for low risk patient  - Hepatitis C Antibody; Future    9. Insulin resistance  - semaglutide (OZEMPIC) 0.25 mg or 0.5 mg(2 mg/1.5 mL) pen injector; Inject 0.25 mg into the skin every 7 days.  Dispense: 1 pen; Refill: 5  - Hemoglobin A1C; Future  - Diet. exercise, and 10% weight loss encouraged. Rx trial of Ozempic with close monitoring to help with insulin resistance and obesity. She agrees to avoid pregnancy with Ozempic. Will titrate Rx as needed/tolerated until max dose achieved. Notify M.D. or ER if symptoms persist or worsen, adverse Rx side effects, temp greater than 100.4, or any acute illness.      10. Class 2 severe obesity due to excess calories with serious comorbidity and body mass index (BMI) of 37.0 to 37.9 in adult  - semaglutide (OZEMPIC) 0.25 mg or 0.5 mg(2 mg/1.5 mL) pen injector; Inject 0.25 mg into the skin every 7 days.  Dispense: 1 pen; Refill: 5  - Vitamin D; Future  - Body mass index is 37.32 kg/m².  Goal BMI <30.  Exercise 5 times a week for 30 minutes per day.  Avoid soda, simple sugars, excessive rice, potatoes or bread. Limit fast foods and fried foods.  Choose complex carbs in moderation (example: green vegetables, beans, oatmeal). Eat plenty of fresh fruits and vegetables with lean meats daily.  Do not skip meals. Eat a balanced portion size.  Avoid fad diets. Consider permanent healthy life style changes.      11. Male hypogonadism  - Testosterone; Future  - Will treat pending results.     12. Abnormal finding of blood chemistry, unspecified  - Hemoglobin A1C; Future        Medication List with Changes/Refills   New Medications    SEMAGLUTIDE (OZEMPIC) 0.25 MG OR 0.5 MG(2 MG/1.5 ML) PEN INJECTOR    Inject 0.25 mg into the skin every 7 days.       Start Date: 2/2/2023  End Date: 2/2/2024   Current Medications     ATORVASTATIN (LIPITOR) 10 MG TABLET    Take 10 mg by mouth every evening.       Start Date: 4/23/2022 End Date: --    CIPROFLOXACIN HCL (CIPRO) 500 MG TABLET    Take 500 mg by mouth 2 (two) times daily.       Start Date: 11/27/2022End Date: --    DICLOFENAC (VOLTAREN) 75 MG EC TABLET    Take 75 mg by mouth 2 (two) times daily as needed.       Start Date: 1/25/2022 End Date: --    FEBUXOSTAT (ULORIC) 80 MG TAB    Take 80 mg by mouth as needed.       Start Date: 2/18/2022 End Date: --    FUROSEMIDE (LASIX) 40 MG TABLET    TAKE 1 TABLET BY MOUTH EVERY DAY AS NEEDED FOR SWELLING       Start Date: 8/15/2022 End Date: --    GABAPENTIN (NEURONTIN) 100 MG CAPSULE    TAKE 1 CAPSULE BY MOUTH THREE TIMES A DAY AS NEEDED FOR NEUROPATHIC PAIN       Start Date: 9/14/2022 End Date: --    GUAIFENESIN (MUCINEX) 600 MG 12 HR TABLET    600 mg as needed.       Start Date: --        End Date: --    HYDROCHLOROTHIAZIDE (MICROZIDE) 12.5 MG CAPSULE    Take 12.5 mg by mouth.       Start Date: 9/27/2022 End Date: --    HYDROXYZINE HCL (ATARAX) 25 MG TABLET    TAKE 1 TABLET BY MOUTH EVERY DAY AT BEDTIME AS NEEDED FOR INSOMNIA       Start Date: 10/4/2022 End Date: --    LEVOTHYROXINE (SYNTHROID) 25 MCG TABLET    Take 1 tablet by mouth once daily.       Start Date: 7/6/2022  End Date: --    LORATADINE (CLARITIN) 10 MG TABLET    Take 10 mg by mouth daily as needed.       Start Date: 10/4/2021 End Date: --    PANTOPRAZOLE (PROTONIX) 40 MG TABLET    Take 40 mg by mouth once daily.       Start Date: 6/5/2022  End Date: --          The patient's Health Maintenance was reviewed and the following appears to be due at this time:   Health Maintenance Due   Topic Date Due    Hepatitis C Screening  Never done    PROSTATE-SPECIFIC ANTIGEN  11/19/2020    Hemoglobin A1c (Diabetic Prevention Screening)  11/19/2022    Colorectal Cancer Screening  02/02/2023         Follow up in about 4 weeks (around 3/2/2023) for Obesity Followup, Virtual Visit.

## 2023-02-04 ENCOUNTER — PATIENT MESSAGE (OUTPATIENT)
Dept: FAMILY MEDICINE | Facility: CLINIC | Age: 68
End: 2023-02-04
Payer: MEDICARE

## 2023-02-06 LAB
GLUCOSE: 109
HBA1C MFR BLD: 5.5 % (ref 4–6)
HDLC SERPL-MCNC: 36 MG/DL
MCH RBC QN AUTO: 33.6 PG
PSA: 0.75
TRIGL SERPL-MCNC: 213 MG/DL
URIC ACID: 8.7 MG/DL
URINE PROTEIN: NORMAL

## 2023-02-08 ENCOUNTER — PATIENT MESSAGE (OUTPATIENT)
Dept: FAMILY MEDICINE | Facility: CLINIC | Age: 68
End: 2023-02-08
Payer: MEDICARE

## 2023-02-09 ENCOUNTER — DOCUMENTATION ONLY (OUTPATIENT)
Dept: FAMILY MEDICINE | Facility: CLINIC | Age: 68
End: 2023-02-09
Payer: MEDICARE

## 2023-02-09 DIAGNOSIS — E78.49 ESSENTIAL FAMILIAL HYPERLIPIDEMIA: ICD-10-CM

## 2023-02-09 DIAGNOSIS — M10.9 GOUT, UNSPECIFIED CAUSE, UNSPECIFIED CHRONICITY, UNSPECIFIED SITE: Primary | ICD-10-CM

## 2023-02-15 ENCOUNTER — TELEPHONE (OUTPATIENT)
Dept: FAMILY MEDICINE | Facility: CLINIC | Age: 68
End: 2023-02-15
Payer: MEDICARE

## 2023-02-15 LAB — NONINV COLON CA DNA+OCC BLD SCRN STL QL: NEGATIVE

## 2023-02-15 NOTE — TELEPHONE ENCOUNTER
----- Message from Cara Fiore MD sent at 2/15/2023  1:39 PM CST -----  Cologuard is negative, routine Cologuard in 02/2026.

## 2023-03-02 ENCOUNTER — CLINICAL SUPPORT (OUTPATIENT)
Dept: FAMILY MEDICINE | Facility: CLINIC | Age: 68
End: 2023-03-02
Payer: MEDICARE

## 2023-03-02 VITALS — DIASTOLIC BLOOD PRESSURE: 80 MMHG | BODY MASS INDEX: 36.98 KG/M2 | SYSTOLIC BLOOD PRESSURE: 138 MMHG | WEIGHT: 288 LBS

## 2023-03-02 DIAGNOSIS — E66.9 OBESITY WITH BODY MASS INDEX (BMI) OF 30.0 TO 39.9: Primary | ICD-10-CM

## 2023-03-02 PROCEDURE — 99442 PR PHYSICIAN TELEPHONE EVALUATION 11-20 MIN: ICD-10-PCS | Mod: 95,,, | Performed by: FAMILY MEDICINE

## 2023-03-02 PROCEDURE — 99442 PR PHYSICIAN TELEPHONE EVALUATION 11-20 MIN: CPT | Mod: 95,,, | Performed by: FAMILY MEDICINE

## 2023-03-02 NOTE — PROGRESS NOTES
Patient ID: [id number]     Chief Complaint: Obesity        HPI:   Disclaimer:  This note is prepared using voice recognition software and as such is likely to have errors despite attempts at proofreading. Please contact me for questions.     This is a telemedicine note. Patient was treated using telemedicine, real time audio and video, according to [facility] protocols. Alexandru DAO MD, conducted the visit from the [facility]. The patient participated in the visit at a non-[facility] location selected by the patient, identified below. I am licensed in the state where the patient stated they are located. The patient stated that they understood and accepted the privacy and security risks to their information at their location. This visit is not recorded.    Patient was located at the patient's home.     Isidoro Leal is a 67 y.o. male here today via telemedicine for obesity follow-up.  The patient was previously prescribed Ozempic however states that he did not receive the medication.  He states that the medication was initially denied by his insurance and an appeal was filed however he is not received any indication of confirmation from his insurance.  He states that it has been difficult to lose weight due to chronic back pain which limits his ability to exercise.  He admits to a carbohydrate rich diet.  The patient's last TSH was 3.3 and last A1c 5.5 (02/07/2023).      Past Medical History:   Diagnosis Date    Allergy     Hypercholesterolemia     Hypogonadism in male     Insomnia     Obesity, unspecified     Vitamin D deficiency         Past Surgical History:   Procedure Laterality Date    APPENDECTOMY      SINUS SURGERY         Review of patient's allergies indicates:   Allergen Reactions    Montelukast Anaphylaxis    Clindamycin      Other reaction(s): Cutaneous eruption, Weal    Hydrocodone bitartrate     Levofloxacin     Penicillins     Propoxyphene n-acetaminophen        Outpatient Medications Marked  as Taking for the 3/2/23 encounter (Clinical Support) with PROVIDER, [facility]   Medication Sig Dispense Refill    atorvastatin (LIPITOR) 10 MG tablet Take 10 mg by mouth every evening.      ciprofloxacin HCl (CIPRO) 500 MG tablet Take 500 mg by mouth 2 (two) times daily.      diclofenac (VOLTAREN) 75 MG EC tablet Take 75 mg by mouth 2 (two) times daily as needed.      febuxostat (ULORIC) 80 mg Tab Take 80 mg by mouth as needed.      guaiFENesin (MUCINEX) 600 mg 12 hr tablet 600 mg as needed.      hydroCHLOROthiazide (MICROZIDE) 12.5 mg capsule Take 12.5 mg by mouth.      hydrOXYzine HCL (ATARAX) 25 MG tablet TAKE 1 TABLET BY MOUTH EVERY DAY AT BEDTIME AS NEEDED FOR INSOMNIA 90 tablet 3    levothyroxine (SYNTHROID) 25 MCG tablet Take 1 tablet by mouth once daily.      loratadine (CLARITIN) 10 mg tablet Take 10 mg by mouth daily as needed.      pantoprazole (PROTONIX) 40 MG tablet Take 40 mg by mouth once daily.         Social History     Socioeconomic History    Marital status:    Tobacco Use    Smoking status: Never    Smokeless tobacco: Never   Substance and Sexual Activity    Alcohol use: Never    Drug use: Never    Sexual activity: Yes        Family History   Problem Relation Age of Onset    Hypertension Mother     Stroke Father     Hyperlipidemia Brother     Hyperlipidemia Brother     Hyperlipidemia Brother     Hyperlipidemia Brother         Subjective:     Review of Systems   Constitutional:  Negative for chills, fever and weight loss.   HENT:  Negative for hearing loss.    Eyes:  Negative for discharge.   Respiratory:  Negative for shortness of breath and wheezing.    Cardiovascular:  Negative for chest pain and palpitations.   Gastrointestinal:  Negative for blood in stool, constipation, diarrhea and vomiting.   Genitourinary:  Negative for hematuria and urgency.   Musculoskeletal:  Negative for neck pain.   Neurological:  Negative for weakness and headaches.   Endo/Heme/Allergies:   Negative for polydipsia.    See HPI for details  All Other ROS: Negative except as stated in HPI.       Objective:     /80 Comment: Patient recorded it from home and Gave verbal  Wt 130.6 kg (288 lb)   BMI 36.98 kg/m²     Physical Exam: LIMITED DUE TO TELEMEDICINE RESTRICTIONS.  General: Alert and oriented, No acute distress.  Head: Normocephalic, Atraumatic.  Eye: Sclera non-icteric.  Neck/Thyroid:  Full range of motion.  Respiratory: Non-labored respirations, Symmetrical chest wall expansion.  Musculoskeletal: Normal range of motion.  Integumentary: Warm, Dry, Intact, No visible suspicious lesions or rashes. No diaphoresis.   Neurologic: No focal deficits  Psychiatric: Normal interaction, Coherent speech, Euthymic mood, Appropriate affect     Assessment:       ICD-10-CM ICD-9-CM   1. Obesity with body mass index (BMI) of 30.0 to 39.9  E66.9 278.00        Plan:     Problem List Items Addressed This Visit    None  Visit Diagnoses       Obesity with body mass index (BMI) of 30.0 to 39.9    -  Primary         1. Obesity with body mass index (BMI) of 30.0 to 39.9  Limit caffeine and alcohol intake.  Well balanced diet low in sugar and complex carbohydrates as well as increased vegetable intake encouraged.  Moderate intensity exercise 30 min/day at least 5 days/Wk (total 150 min/Wk) recommended.   Patient may consider seated aerobics, water aerobics, or working with a physical therapist.  Patient awaiting results of appeal to insurance about Ozempic.  May consider Wegovy or Contrave if unable to start Ozempic.    Follow up in about 4 weeks (around 3/30/2023) for Obesity.    Video Time Documentation:  Spent 15 minutes with patient face to face via telemedicine discussing health concerns and completing EHR. More than 50% of this time was spent in counseling and coordination of care.

## 2023-03-06 DIAGNOSIS — Z76.89 ENCOUNTER FOR WEIGHT MANAGEMENT: Primary | ICD-10-CM

## 2023-03-06 DIAGNOSIS — E66.9 OBESITY, UNSPECIFIED CLASSIFICATION, UNSPECIFIED OBESITY TYPE, UNSPECIFIED WHETHER SERIOUS COMORBIDITY PRESENT: ICD-10-CM

## 2023-03-06 RX ORDER — SEMAGLUTIDE 0.25 MG/.5ML
0.25 INJECTION, SOLUTION SUBCUTANEOUS
Qty: 2 ML | Refills: 1 | Status: SHIPPED | OUTPATIENT
Start: 2023-03-06 | End: 2023-03-16

## 2023-03-16 ENCOUNTER — PATIENT MESSAGE (OUTPATIENT)
Dept: FAMILY MEDICINE | Facility: CLINIC | Age: 68
End: 2023-03-16
Payer: MEDICARE

## 2023-03-16 ENCOUNTER — PATIENT MESSAGE (OUTPATIENT)
Dept: PRIMARY CARE CLINIC | Facility: CLINIC | Age: 68
End: 2023-03-16
Payer: MEDICARE

## 2023-03-16 DIAGNOSIS — E66.01 CLASS 2 SEVERE OBESITY DUE TO EXCESS CALORIES WITH SERIOUS COMORBIDITY AND BODY MASS INDEX (BMI) OF 36.0 TO 36.9 IN ADULT: Primary | ICD-10-CM

## 2023-03-16 RX ORDER — PHENTERMINE HYDROCHLORIDE 37.5 MG/1
37.5 TABLET ORAL
Qty: 30 TABLET | Refills: 0 | Status: SHIPPED | OUTPATIENT
Start: 2023-03-16 | End: 2023-04-15

## 2023-04-13 ENCOUNTER — PATIENT MESSAGE (OUTPATIENT)
Dept: FAMILY MEDICINE | Facility: CLINIC | Age: 68
End: 2023-04-13
Payer: MEDICARE

## 2023-04-18 ENCOUNTER — OFFICE VISIT (OUTPATIENT)
Dept: FAMILY MEDICINE | Facility: CLINIC | Age: 68
End: 2023-04-18
Payer: MEDICARE

## 2023-04-18 VITALS
OXYGEN SATURATION: 98 % | WEIGHT: 281.88 LBS | SYSTOLIC BLOOD PRESSURE: 124 MMHG | HEART RATE: 71 BPM | BODY MASS INDEX: 36.19 KG/M2 | DIASTOLIC BLOOD PRESSURE: 72 MMHG

## 2023-04-18 DIAGNOSIS — E66.01 CLASS 2 SEVERE OBESITY DUE TO EXCESS CALORIES WITH SERIOUS COMORBIDITY AND BODY MASS INDEX (BMI) OF 36.0 TO 36.9 IN ADULT: Primary | ICD-10-CM

## 2023-04-18 PROCEDURE — 99213 PR OFFICE/OUTPT VISIT, EST, LEVL III, 20-29 MIN: ICD-10-PCS | Mod: ,,, | Performed by: FAMILY MEDICINE

## 2023-04-18 PROCEDURE — 99213 OFFICE O/P EST LOW 20 MIN: CPT | Mod: ,,, | Performed by: FAMILY MEDICINE

## 2023-04-18 RX ORDER — PHENTERMINE HYDROCHLORIDE 37.5 MG/1
37.5 TABLET ORAL
Qty: 30 TABLET | Refills: 0 | Status: SHIPPED | OUTPATIENT
Start: 2023-04-18 | End: 2023-05-26 | Stop reason: SDUPTHER

## 2023-04-18 NOTE — PROGRESS NOTES
Patient ID: [id number]     Chief Complaint: Medication Refill        HPI:     Isidoro Leal is a 67 y.o. male here today for a follow up obesity.  - He is obese, he has lost 9 pounds since last visit with Adipex, no side effects, he is not interested in surgical weight loss or nutrition referral, he needs Rx refill of Adipex today. He denies chest pain, palpitations, or SI/HI. His insurance would not cover Wegovy.   - He is seeing a Chiropractor for chronic low back pain.   - Patient is without any other complaints today.         ----------------------------  Allergy  Hypercholesterolemia  Hypogonadism in male  Insomnia  Obesity, unspecified  Vitamin D deficiency     Past Surgical History:   Procedure Laterality Date    APPENDECTOMY      SINUS SURGERY         Review of patient's allergies indicates:   Allergen Reactions    Montelukast Anaphylaxis    Clindamycin      Other reaction(s): Cutaneous eruption, Weal    Hydrocodone bitartrate     Levofloxacin     Penicillins     Propoxyphene n-acetaminophen        Outpatient Medications Marked as Taking for the 4/18/23 encounter (Office Visit) with Cara Fiore MD   Medication Sig Dispense Refill    atorvastatin (LIPITOR) 10 MG tablet Take 10 mg by mouth every evening.      ciprofloxacin HCl (CIPRO) 500 MG tablet Take 500 mg by mouth 2 (two) times daily.      diclofenac (VOLTAREN) 75 MG EC tablet Take 75 mg by mouth 2 (two) times daily as needed.      febuxostat (ULORIC) 80 mg Tab Take 80 mg by mouth as needed.      furosemide (LASIX) 40 MG tablet TAKE 1 TABLET BY MOUTH EVERY DAY AS NEEDED FOR SWELLING (Patient taking differently: Take 40 mg by mouth as needed.) 90 tablet 3    gabapentin (NEURONTIN) 100 MG capsule TAKE 1 CAPSULE BY MOUTH THREE TIMES A DAY AS NEEDED FOR NEUROPATHIC PAIN 90 capsule 5    guaiFENesin (MUCINEX) 600 mg 12 hr tablet 600 mg as needed.      hydroCHLOROthiazide (MICROZIDE) 12.5 mg capsule Take 12.5 mg by mouth.      hydrOXYzine HCL (ATARAX) 25  MG tablet TAKE 1 TABLET BY MOUTH EVERY DAY AT BEDTIME AS NEEDED FOR INSOMNIA 90 tablet 3    levothyroxine (SYNTHROID) 25 MCG tablet TAKE 1/2 TABLET BY MOUTH EVERY DAY IN THE MORNING 45 tablet 3    loratadine (CLARITIN) 10 mg tablet Take 10 mg by mouth daily as needed.      pantoprazole (PROTONIX) 40 MG tablet Take 40 mg by mouth once daily.         Social History     Socioeconomic History    Marital status:    Tobacco Use    Smoking status: Never    Smokeless tobacco: Never   Substance and Sexual Activity    Alcohol use: Never    Drug use: Never    Sexual activity: Yes        Family History   Problem Relation Age of Onset    Hypertension Mother     Stroke Father     Hyperlipidemia Brother     Hyperlipidemia Brother     Hyperlipidemia Brother     Hyperlipidemia Brother         Subjective:       Review of Systems:    See HPI for details    Constitutional: Denies Change in appetite. Denies Chills. Denies Fever. Denies Night sweats.  Eye: Denies Blurred vision. Denies Discharge. Denies Eye pain.  ENT: Denies Decreased hearing. Denies Sore throat. Denies Swollen glands.  Respiratory: Denies Cough. Denies Shortness of breath. Denies Shortness of breath with exertion. Denies Wheezing.  Cardiovascular: Denies Chest pain at rest. Denies Chest pain with exertion. Denies Irregular heartbeat. Denies Palpitations.  Gastrointestinal: Denies Abdominal pain. Denies Diarrhea. Denies Nausea. Denies Vomiting. Denies Hematemesis or Hematochezia.  Genitourinary: Denies Dysuria. Denies Urinary frequency. Denies Urinary urgency. Denies Blood in urine.  Endocrine: Denies Cold intolerance. Denies Excessive thirst. Denies Heat intolerance. Denies Weight loss. Denies Weight gain.  Musculoskeletal: Denies Painful joints. Denies Weakness.  Integumentary: Denies Rash. Denies Itching. Denies Dry skin.  Neurologic: Denies Dizziness. Denies Fainting. Denies Headache.  Psychiatric: Denies Depression. Denies Anxiety. Denies Suicidal/Homicidal  ideations.    All Other ROS: Negative except as stated in HPI.       Objective:     /72 (BP Location: Right arm, Patient Position: Sitting, BP Method: Large (Manual))   Pulse 71   Wt 127.9 kg (281 lb 14.4 oz)   SpO2 98%   BMI 36.19 kg/m²     Physical Exam    General: Alert and oriented, No acute distress. Obese.   Head: Normocephalic, Atraumatic.  Eye: Pupils are equal, round and reactive to light, Extraocular movements are intact, Sclera non-icteric.  Ears/Nose/Throat: Normal, Mucosa moist,Clear.  Neck/Thyroid: Supple, Non-tender, No carotid bruit, No palpable thyromegaly or thyroid nodule, No lymphadenopathy, No JVD, Full range of motion.  Respiratory: Clear to auscultation bilaterally; No wheezes, rales or rhonchi,Non-labored respirations, Symmetrical chest wall expansion.  Cardiovascular: Regular rate and rhythm, S1/S2 normal, No murmurs, rubs or gallops.  Gastrointestinal: Soft, Non-tender, Non-distended, Normal bowel sounds, No palpable organomegaly.  Musculoskeletal: Normal range of motion.  Integumentary: Warm, Dry, Intact, No suspicious lesions or rashes.  Extremities: No clubbing, cyanosis or edema  Neurologic: No focal deficits, Cranial Nerves II-XII are grossly intact, Motor strength normal upper and lower extremities, Sensory exam intact.  Psychiatric: Normal interaction, Coherent speech, Euthymic mood, Appropriate affect         Assessment:       ICD-10-CM ICD-9-CM   1. Class 2 severe obesity due to excess calories with serious comorbidity and body mass index (BMI) of 36.0 to 36.9 in adult  E66.01 278.01    Z68.36 V85.36        Plan:     Problem List Items Addressed This Visit    None  Visit Diagnoses       Class 2 severe obesity due to excess calories with serious comorbidity and body mass index (BMI) of 36.0 to 36.9 in adult    -  Primary    Relevant Medications    phentermine (ADIPEX-P) 37.5 mg tablet         1. Class 2 severe obesity due to excess calories with serious comorbidity and  body mass index (BMI) of 36.0 to 36.9 in adult  - phentermine (ADIPEX-P) 37.5 mg tablet; Take 1 tablet (37.5 mg total) by mouth before breakfast.  Dispense: 30 tablet; Refill: 0  - Diet, exercise, and 10% weight loss encouraged. Rx Adipex refilled today. Will prescribe Adipex for max of 2 more months.  reviewed today. Will discontinue Adipex and patient to notify M.D. or ER if BP >140/90, chest pain, palpitations, SI/HI with Adipex. Notify M.D. or ER if temp greater than 100.4 or any acute illness.   Body mass index is 36.19 kg/m².  Goal BMI <30.  Exercise 5 times a week for 30 minutes per day.  Avoid soda, simple sugars, excessive rice, potatoes or bread. Limit fast foods and fried foods.  Choose complex carbs in moderation (example: green vegetables, beans, oatmeal). Eat plenty of fresh fruits and vegetables with lean meats daily.  Do not skip meals. Eat a balanced portion size.  Avoid fad diets. Consider permanent healthy life style changes.        Isidoro was seen today for medication refill.    Diagnoses and all orders for this visit:    Class 2 severe obesity due to excess calories with serious comorbidity and body mass index (BMI) of 36.0 to 36.9 in adult  -     phentermine (ADIPEX-P) 37.5 mg tablet; Take 1 tablet (37.5 mg total) by mouth before breakfast.          Medication List with Changes/Refills   New Medications    PHENTERMINE (ADIPEX-P) 37.5 MG TABLET    Take 1 tablet (37.5 mg total) by mouth before breakfast.       Start Date: 4/18/2023 End Date: 5/18/2023   Current Medications    ATORVASTATIN (LIPITOR) 10 MG TABLET    Take 10 mg by mouth every evening.       Start Date: 4/23/2022 End Date: --    CIPROFLOXACIN HCL (CIPRO) 500 MG TABLET    Take 500 mg by mouth 2 (two) times daily.       Start Date: 11/27/2022End Date: --    DICLOFENAC (VOLTAREN) 75 MG EC TABLET    Take 75 mg by mouth 2 (two) times daily as needed.       Start Date: 1/25/2022 End Date: --    FEBUXOSTAT (ULORIC) 80 MG TAB    Take 80  mg by mouth as needed.       Start Date: 2/18/2022 End Date: --    FUROSEMIDE (LASIX) 40 MG TABLET    TAKE 1 TABLET BY MOUTH EVERY DAY AS NEEDED FOR SWELLING       Start Date: 8/15/2022 End Date: --    GABAPENTIN (NEURONTIN) 100 MG CAPSULE    TAKE 1 CAPSULE BY MOUTH THREE TIMES A DAY AS NEEDED FOR NEUROPATHIC PAIN       Start Date: 3/13/2023 End Date: --    GUAIFENESIN (MUCINEX) 600 MG 12 HR TABLET    600 mg as needed.       Start Date: --        End Date: --    HYDROCHLOROTHIAZIDE (MICROZIDE) 12.5 MG CAPSULE    Take 12.5 mg by mouth.       Start Date: 9/27/2022 End Date: --    HYDROXYZINE HCL (ATARAX) 25 MG TABLET    TAKE 1 TABLET BY MOUTH EVERY DAY AT BEDTIME AS NEEDED FOR INSOMNIA       Start Date: 10/4/2022 End Date: --    LEVOTHYROXINE (SYNTHROID) 25 MCG TABLET    TAKE 1/2 TABLET BY MOUTH EVERY DAY IN THE MORNING       Start Date: 4/3/2023  End Date: --    LORATADINE (CLARITIN) 10 MG TABLET    Take 10 mg by mouth daily as needed.       Start Date: 10/4/2021 End Date: --    PANTOPRAZOLE (PROTONIX) 40 MG TABLET    Take 40 mg by mouth once daily.       Start Date: 6/5/2022  End Date: --          Follow up in about 4 weeks (around 5/16/2023) for Obesity Followup, Virtual Visit.

## 2023-04-24 ENCOUNTER — PATIENT MESSAGE (OUTPATIENT)
Dept: FAMILY MEDICINE | Facility: CLINIC | Age: 68
End: 2023-04-24
Payer: MEDICARE

## 2023-05-26 DIAGNOSIS — E03.8 OTHER SPECIFIED HYPOTHYROIDISM: ICD-10-CM

## 2023-05-26 DIAGNOSIS — E66.01 CLASS 2 SEVERE OBESITY DUE TO EXCESS CALORIES WITH SERIOUS COMORBIDITY AND BODY MASS INDEX (BMI) OF 36.0 TO 36.9 IN ADULT: ICD-10-CM

## 2023-05-26 RX ORDER — LEVOTHYROXINE SODIUM 25 UG/1
12.5 TABLET ORAL
Qty: 45 TABLET | Refills: 3 | Status: SHIPPED | OUTPATIENT
Start: 2023-05-26 | End: 2024-03-25

## 2023-05-26 RX ORDER — PHENTERMINE HYDROCHLORIDE 37.5 MG/1
37.5 TABLET ORAL
Qty: 30 TABLET | Refills: 0 | Status: SHIPPED | OUTPATIENT
Start: 2023-05-26 | End: 2023-06-20

## 2023-06-01 ENCOUNTER — OFFICE VISIT (OUTPATIENT)
Dept: FAMILY MEDICINE | Facility: CLINIC | Age: 68
End: 2023-06-01
Payer: MEDICARE

## 2023-06-01 VITALS
SYSTOLIC BLOOD PRESSURE: 110 MMHG | WEIGHT: 280.19 LBS | BODY MASS INDEX: 35.98 KG/M2 | OXYGEN SATURATION: 98 % | HEART RATE: 88 BPM | DIASTOLIC BLOOD PRESSURE: 86 MMHG

## 2023-06-01 DIAGNOSIS — M47.26 OSTEOARTHRITIS OF SPINE WITH RADICULOPATHY, LUMBAR REGION: Primary | ICD-10-CM

## 2023-06-01 DIAGNOSIS — E66.01 CLASS 2 SEVERE OBESITY DUE TO EXCESS CALORIES WITH SERIOUS COMORBIDITY AND BODY MASS INDEX (BMI) OF 35.0 TO 35.9 IN ADULT: ICD-10-CM

## 2023-06-01 DIAGNOSIS — E88.819 INSULIN RESISTANCE: ICD-10-CM

## 2023-06-01 PROCEDURE — 99214 OFFICE O/P EST MOD 30 MIN: CPT | Mod: ,,, | Performed by: FAMILY MEDICINE

## 2023-06-01 PROCEDURE — 99214 PR OFFICE/OUTPT VISIT, EST, LEVL IV, 30-39 MIN: ICD-10-PCS | Mod: ,,, | Performed by: FAMILY MEDICINE

## 2023-06-01 RX ORDER — GABAPENTIN 300 MG/1
300 CAPSULE ORAL 3 TIMES DAILY
Qty: 90 CAPSULE | Refills: 5 | Status: SHIPPED | OUTPATIENT
Start: 2023-06-01 | End: 2023-11-28

## 2023-06-01 NOTE — PROGRESS NOTES
Patient ID: [id number]     Chief Complaint: Medication Refill        HPI:     Isidoro Leal is a 68 y.o. male here today for a follow up obesity.  - He is obese, he has lost 1 pound since last visit with Adipex, no side effects, he is not interested in surgical weight loss or nutrition referral, he is on his third month of Adipex. He denies chest pain, palpitations, or SI/HI. His insurance would not cover Wegovy. He does have insulin resistance, would like to try Rybelsus. He denies family history of medullary thyroid cancer or MEN II and personal history of pancreatitis.   - He has DJD L-spine with radiculopathy, he does followup with Chiropractor as scheduled, reports some improvement with Gabapentin, no side effects, would like to proceed with increased dose.    - Patient is without any other complaints today.         ----------------------------  Allergy  Hypercholesterolemia  Hypogonadism in male  Insomnia  Obesity, unspecified  Vitamin D deficiency     Past Surgical History:   Procedure Laterality Date    APPENDECTOMY      SINUS SURGERY         Review of patient's allergies indicates:   Allergen Reactions    Montelukast Anaphylaxis    Clindamycin      Other reaction(s): Cutaneous eruption, Weal    Hydrocodone bitartrate     Levofloxacin     Penicillins     Propoxyphene n-acetaminophen        Outpatient Medications Marked as Taking for the 6/1/23 encounter (Office Visit) with Cara Fiore MD   Medication Sig Dispense Refill    atorvastatin (LIPITOR) 10 MG tablet Take 10 mg by mouth every evening.      ciprofloxacin HCl (CIPRO) 500 MG tablet Take 500 mg by mouth 2 (two) times daily.      diclofenac (VOLTAREN) 75 MG EC tablet Take 75 mg by mouth 2 (two) times daily as needed.      febuxostat (ULORIC) 80 mg Tab Take 80 mg by mouth as needed.      furosemide (LASIX) 40 MG tablet TAKE 1 TABLET BY MOUTH EVERY DAY AS NEEDED FOR SWELLING (Patient taking differently: Take 40 mg by mouth as needed.) 90 tablet 3     guaiFENesin (MUCINEX) 600 mg 12 hr tablet 600 mg as needed.      hydroCHLOROthiazide (MICROZIDE) 12.5 mg capsule Take 12.5 mg by mouth.      hydrOXYzine HCL (ATARAX) 25 MG tablet TAKE 1 TABLET BY MOUTH EVERY DAY AT BEDTIME AS NEEDED FOR INSOMNIA 90 tablet 3    levothyroxine (SYNTHROID) 25 MCG tablet Take 0.5 tablets (12.5 mcg total) by mouth before breakfast. 45 tablet 3    loratadine (CLARITIN) 10 mg tablet Take 10 mg by mouth daily as needed.      pantoprazole (PROTONIX) 40 MG tablet Take 40 mg by mouth once daily.      phentermine (ADIPEX-P) 37.5 mg tablet Take 1 tablet (37.5 mg total) by mouth before breakfast. 30 tablet 0    [DISCONTINUED] gabapentin (NEURONTIN) 100 MG capsule TAKE 1 CAPSULE BY MOUTH THREE TIMES A DAY AS NEEDED FOR NEUROPATHIC PAIN 90 capsule 5       Social History     Socioeconomic History    Marital status:    Tobacco Use    Smoking status: Never    Smokeless tobacco: Never   Substance and Sexual Activity    Alcohol use: Never    Drug use: Never    Sexual activity: Yes        Family History   Problem Relation Age of Onset    Hypertension Mother     Stroke Father     Hyperlipidemia Brother     Hyperlipidemia Brother     Hyperlipidemia Brother     Hyperlipidemia Brother         Subjective:       Review of Systems:    See HPI for details    Constitutional: Denies Change in appetite. Denies Chills. Denies Fever. Denies Night sweats.  Eye: Denies Blurred vision. Denies Discharge. Denies Eye pain.  ENT: Denies Decreased hearing. Denies Sore throat. Denies Swollen glands.  Respiratory: Denies Cough. Denies Shortness of breath. Denies Shortness of breath with exertion. Denies Wheezing.  Cardiovascular: Denies Chest pain at rest. Denies Chest pain with exertion. Denies Irregular heartbeat. Denies Palpitations.  Gastrointestinal: Denies Abdominal pain. Denies Diarrhea. Denies Nausea. Denies Vomiting. Denies Hematemesis or Hematochezia.  Genitourinary: Denies Dysuria. Denies Urinary frequency.  Denies Urinary urgency. Denies Blood in urine.  Endocrine: Denies Cold intolerance. Denies Excessive thirst. Denies Heat intolerance. Denies Weight loss. Denies Weight gain.  Musculoskeletal: As per HPI. Denies Painful joints. Denies Weakness.  Integumentary: Denies Rash. Denies Itching. Denies Dry skin.  Neurologic: Denies Dizziness. Denies Fainting. Denies Headache.  Psychiatric: Denies Depression. Denies Anxiety. Denies Suicidal/Homicidal ideations.    All Other ROS: Negative except as stated in HPI.   Answers submitted by the patient for this visit:  Review of Systems Questionnaire (Submitted on 5/26/2023)  activity change: No  unexpected weight change: No  neck pain: No  hearing loss: No  rhinorrhea: No  trouble swallowing: No  eye discharge: No  visual disturbance: No  chest tightness: No  wheezing: No  chest pain: No  palpitations: No  blood in stool: No  constipation: No  vomiting: No  diarrhea: No  polydipsia: No  polyuria: No  difficulty urinating: No  urgency: No  hematuria: No  joint swelling: No  arthralgias: No  headaches: No  weakness: No  confusion: No  dysphoric mood: No      Objective:     /86 (BP Location: Left arm, Patient Position: Sitting, BP Method: Large (Manual))   Pulse 88   Wt 127.1 kg (280 lb 3.2 oz)   SpO2 98%   BMI 35.98 kg/m²     Physical Exam    General: Alert and oriented, No acute distress. Obese.   Head: Normocephalic, Atraumatic.  Eye: Pupils are equal, round and reactive to light, Extraocular movements are intact, Sclera non-icteric.  Ears/Nose/Throat: Normal, Mucosa moist,Clear.  Neck/Thyroid: Supple, Non-tender, No carotid bruit, No palpable thyromegaly or thyroid nodule, No lymphadenopathy, No JVD, Full range of motion.  Respiratory: Clear to auscultation bilaterally; No wheezes, rales or rhonchi,Non-labored respirations, Symmetrical chest wall expansion.  Cardiovascular: Regular rate and rhythm, S1/S2 normal, No murmurs, rubs or gallops.  Gastrointestinal: Soft,  Non-tender, Non-distended, Normal bowel sounds, No palpable organomegaly.  Musculoskeletal: Normal range of motion. Back with FROM, mild TTP, no erythema, no effusion, no deformity, negative bilateral seated straight leg raise.   Integumentary: Warm, Dry, Intact, No suspicious lesions or rashes.  Extremities: No clubbing, cyanosis or edema  Neurologic: No focal deficits, Cranial Nerves II-XII are grossly intact, Motor strength normal upper and lower extremities, Sensory exam intact.  Psychiatric: Normal interaction, Coherent speech, Euthymic mood, Appropriate affect         Assessment:       ICD-10-CM ICD-9-CM   1. Osteoarthritis of spine with radiculopathy, lumbar region  M47.26 721.3   2. Insulin resistance  E88.81 277.7   3. Class 2 severe obesity due to excess calories with serious comorbidity and body mass index (BMI) of 35.0 to 35.9 in adult  E66.01 278.01    Z68.35 V85.35        Plan:     Problem List Items Addressed This Visit    None  Visit Diagnoses       Osteoarthritis of spine with radiculopathy, lumbar region    -  Primary    Relevant Medications    gabapentin (NEURONTIN) 300 MG capsule    Insulin resistance        Relevant Medications    semaglutide (RYBELSUS) 3 mg tablet    Class 2 severe obesity due to excess calories with serious comorbidity and body mass index (BMI) of 35.0 to 35.9 in adult        Relevant Medications    semaglutide (RYBELSUS) 3 mg tablet         1. Osteoarthritis of spine with radiculopathy, lumbar region  - Rx trial of increased dose of gabapentin (NEURONTIN) 300 MG capsule; Take 1 capsule (300 mg total) by mouth 3 (three) times daily.  Dispense: 90 capsule; Refill: 5  - Continue stretching exercises. Notify M.D. or ER if symptoms persist or worsen, adverse Rx side effects, temp >100.4, or any acute illness.      2. Insulin resistance  - semaglutide (RYBELSUS) 3 mg tablet; Take 1 tablet (3 mg total) by mouth once daily.  Dispense: 30 tablet; Refill: 1  - Diet. exercise, and 10%  weight loss encouraged. Rx trial of Rybelsus with close monitoring to help with insulin resistance and obesity. Will titrate Rx Rybelsus as needed/tolerated until max dose achieved. Notify M.D. or ER if symptoms persist or worsen, adverse Rx side effects, temp greater than 100.4, or any acute illness.    Body mass index is 35.98 kg/m².  Goal BMI <30.  Exercise 5 times a week for 30 minutes per day.  Avoid soda, simple sugars, excessive rice, potatoes or bread. Limit fast foods and fried foods.  Choose complex carbs in moderation (example: green vegetables, beans, oatmeal). Eat plenty of fresh fruits and vegetables with lean meats daily.  Do not skip meals. Eat a balanced portion size.  Avoid fad diets. Consider permanent healthy life style changes.      3. Class 2 severe obesity due to excess calories with serious comorbidity and body mass index (BMI) of 35.0 to 35.9 in adult  - semaglutide (RYBELSUS) 3 mg tablet; Take 1 tablet (3 mg total) by mouth once daily.  Dispense: 30 tablet; Refill: 1  - Same as #2.       Isidoro was seen today for medication refill.    Diagnoses and all orders for this visit:    Osteoarthritis of spine with radiculopathy, lumbar region  -     gabapentin (NEURONTIN) 300 MG capsule; Take 1 capsule (300 mg total) by mouth 3 (three) times daily.    Insulin resistance  -     semaglutide (RYBELSUS) 3 mg tablet; Take 1 tablet (3 mg total) by mouth once daily.    Class 2 severe obesity due to excess calories with serious comorbidity and body mass index (BMI) of 35.0 to 35.9 in adult  -     semaglutide (RYBELSUS) 3 mg tablet; Take 1 tablet (3 mg total) by mouth once daily.          Medication List with Changes/Refills   New Medications    GABAPENTIN (NEURONTIN) 300 MG CAPSULE    Take 1 capsule (300 mg total) by mouth 3 (three) times daily.       Start Date: 6/1/2023  End Date: 11/28/2023    SEMAGLUTIDE (RYBELSUS) 3 MG TABLET    Take 1 tablet (3 mg total) by mouth once daily.       Start Date:  6/1/2023  End Date: 7/31/2023   Current Medications    ATORVASTATIN (LIPITOR) 10 MG TABLET    Take 10 mg by mouth every evening.       Start Date: 4/23/2022 End Date: --    CIPROFLOXACIN HCL (CIPRO) 500 MG TABLET    Take 500 mg by mouth 2 (two) times daily.       Start Date: 11/27/2022End Date: --    DICLOFENAC (VOLTAREN) 75 MG EC TABLET    Take 75 mg by mouth 2 (two) times daily as needed.       Start Date: 1/25/2022 End Date: --    FEBUXOSTAT (ULORIC) 80 MG TAB    Take 80 mg by mouth as needed.       Start Date: 2/18/2022 End Date: --    FUROSEMIDE (LASIX) 40 MG TABLET    TAKE 1 TABLET BY MOUTH EVERY DAY AS NEEDED FOR SWELLING       Start Date: 8/15/2022 End Date: --    GUAIFENESIN (MUCINEX) 600 MG 12 HR TABLET    600 mg as needed.       Start Date: --        End Date: --    HYDROCHLOROTHIAZIDE (MICROZIDE) 12.5 MG CAPSULE    Take 12.5 mg by mouth.       Start Date: 9/27/2022 End Date: --    HYDROXYZINE HCL (ATARAX) 25 MG TABLET    TAKE 1 TABLET BY MOUTH EVERY DAY AT BEDTIME AS NEEDED FOR INSOMNIA       Start Date: 10/4/2022 End Date: --    LEVOTHYROXINE (SYNTHROID) 25 MCG TABLET    Take 0.5 tablets (12.5 mcg total) by mouth before breakfast.       Start Date: 5/26/2023 End Date: --    LORATADINE (CLARITIN) 10 MG TABLET    Take 10 mg by mouth daily as needed.       Start Date: 10/4/2021 End Date: --    PANTOPRAZOLE (PROTONIX) 40 MG TABLET    Take 40 mg by mouth once daily.       Start Date: 6/5/2022  End Date: --    PHENTERMINE (ADIPEX-P) 37.5 MG TABLET    Take 1 tablet (37.5 mg total) by mouth before breakfast.       Start Date: 5/26/2023 End Date: 6/25/2023   Discontinued Medications    GABAPENTIN (NEURONTIN) 100 MG CAPSULE    TAKE 1 CAPSULE BY MOUTH THREE TIMES A DAY AS NEEDED FOR NEUROPATHIC PAIN       Start Date: 3/13/2023 End Date: 6/1/2023          Follow up in about 4 weeks (around 6/29/2023) for Insulin resistance followup.

## 2023-06-20 ENCOUNTER — OFFICE VISIT (OUTPATIENT)
Dept: FAMILY MEDICINE | Facility: CLINIC | Age: 68
End: 2023-06-20
Payer: MEDICARE

## 2023-06-20 VITALS
OXYGEN SATURATION: 97 % | WEIGHT: 278 LBS | HEART RATE: 74 BPM | TEMPERATURE: 98 F | RESPIRATION RATE: 18 BRPM | SYSTOLIC BLOOD PRESSURE: 138 MMHG | HEIGHT: 74 IN | DIASTOLIC BLOOD PRESSURE: 82 MMHG | BODY MASS INDEX: 35.68 KG/M2

## 2023-06-20 DIAGNOSIS — E88.819 INSULIN RESISTANCE: Primary | ICD-10-CM

## 2023-06-20 PROCEDURE — 99213 PR OFFICE/OUTPT VISIT, EST, LEVL III, 20-29 MIN: ICD-10-PCS | Mod: ,,, | Performed by: FAMILY MEDICINE

## 2023-06-20 PROCEDURE — 99213 OFFICE O/P EST LOW 20 MIN: CPT | Mod: ,,, | Performed by: FAMILY MEDICINE

## 2023-06-20 NOTE — PROGRESS NOTES
Patient ID: [id number]     Chief Complaint: Follow-up        HPI:     Isidoro Leal is a 68 y.o. male here today for a follow up obesity.   - He is obese with insulin resistance, he has lost 2 pounds since last visit with Rybelsus, no side effects, he would like to proceed with increased dose of Rybelsus, he is not interested in surgical weight loss or nutrition referral. He denies chest pain, palpitations, or SI/HI. His insurance would not cover Wegovy. He denies family history of medullary thyroid cancer or MEN II and personal history of pancreatitis.   - Patient is without any other complaints today.         ----------------------------  Allergy  Hypercholesterolemia  Hypogonadism in male  Insomnia  Obesity, unspecified  Vitamin D deficiency     Past Surgical History:   Procedure Laterality Date    APPENDECTOMY      SINUS SURGERY         Review of patient's allergies indicates:   Allergen Reactions    Montelukast Anaphylaxis    Clindamycin Other (See Comments)     Other reaction(s): Cutaneous eruption, Weal    Hydrocodone bitartrate     Levofloxacin Other (See Comments)    Penicillins     Propoxyphene n-acetaminophen        Outpatient Medications Marked as Taking for the 6/20/23 encounter (Office Visit) with Cara Fiore MD   Medication Sig Dispense Refill    atorvastatin (LIPITOR) 10 MG tablet Take 10 mg by mouth every evening.      ciprofloxacin HCl (CIPRO) 500 MG tablet Take 500 mg by mouth 2 (two) times daily.      diclofenac (VOLTAREN) 75 MG EC tablet Take 75 mg by mouth 2 (two) times daily as needed.      febuxostat (ULORIC) 80 mg Tab Take 80 mg by mouth as needed.      furosemide (LASIX) 40 MG tablet TAKE 1 TABLET BY MOUTH EVERY DAY AS NEEDED FOR SWELLING (Patient taking differently: Take 40 mg by mouth as needed.) 90 tablet 3    gabapentin (NEURONTIN) 300 MG capsule Take 1 capsule (300 mg total) by mouth 3 (three) times daily. 90 capsule 5    guaiFENesin (MUCINEX) 600 mg 12 hr tablet 600 mg as  needed.      hydroCHLOROthiazide (MICROZIDE) 12.5 mg capsule Take 12.5 mg by mouth.      hydrOXYzine HCL (ATARAX) 25 MG tablet TAKE 1 TABLET BY MOUTH EVERY DAY AT BEDTIME AS NEEDED FOR INSOMNIA 90 tablet 3    levothyroxine (SYNTHROID) 25 MCG tablet Take 0.5 tablets (12.5 mcg total) by mouth before breakfast. 45 tablet 3    loratadine (CLARITIN) 10 mg tablet Take 10 mg by mouth daily as needed.      pantoprazole (PROTONIX) 40 MG tablet Take 40 mg by mouth once daily.      [DISCONTINUED] semaglutide (RYBELSUS) 3 mg tablet Take 1 tablet (3 mg total) by mouth once daily. 30 tablet 1       Social History     Socioeconomic History    Marital status:    Tobacco Use    Smoking status: Never    Smokeless tobacco: Never   Substance and Sexual Activity    Alcohol use: Never    Drug use: Never    Sexual activity: Yes     Social Determinants of Health     Financial Resource Strain: Low Risk     Difficulty of Paying Living Expenses: Not hard at all   Food Insecurity: No Food Insecurity    Worried About Running Out of Food in the Last Year: Never true    Ran Out of Food in the Last Year: Never true   Transportation Needs: No Transportation Needs    Lack of Transportation (Medical): No    Lack of Transportation (Non-Medical): No   Physical Activity: Sufficiently Active    Days of Exercise per Week: 7 days    Minutes of Exercise per Session: 30 min   Stress: No Stress Concern Present    Feeling of Stress : Only a little   Social Connections: Socially Integrated    Frequency of Communication with Friends and Family: More than three times a week    Frequency of Social Gatherings with Friends and Family: More than three times a week    Attends Denominational Services: More than 4 times per year    Active Member of Clubs or Organizations: Yes    Attends Club or Organization Meetings: More than 4 times per year    Marital Status:    Housing Stability: Low Risk     Unable to Pay for Housing in the Last Year: No    Number of  "Places Lived in the Last Year: 1    Unstable Housing in the Last Year: No        Family History   Problem Relation Age of Onset    Hypertension Mother     Stroke Father     Hyperlipidemia Brother     Hyperlipidemia Brother     Hyperlipidemia Brother     Hyperlipidemia Brother         Subjective:       Review of Systems:    See HPI for details    Constitutional: Denies Change in appetite. Denies Chills. Denies Fever. Denies Night sweats.  Eye: Denies Blurred vision. Denies Discharge. Denies Eye pain.  ENT: Denies Decreased hearing. Denies Sore throat. Denies Swollen glands.  Respiratory: Denies Cough. Denies Shortness of breath. Denies Shortness of breath with exertion. Denies Wheezing.  Cardiovascular: Denies Chest pain at rest. Denies Chest pain with exertion. Denies Irregular heartbeat. Denies Palpitations.  Gastrointestinal: Denies Abdominal pain. Denies Diarrhea. Denies Nausea. Denies Vomiting. Denies Hematemesis or Hematochezia.  Genitourinary: Denies Dysuria. Denies Urinary frequency. Denies Urinary urgency. Denies Blood in urine.  Endocrine: Denies Cold intolerance. Denies Excessive thirst. Denies Heat intolerance. Denies Weight loss. Denies Weight gain.  Musculoskeletal: Denies Painful joints. Denies Weakness.  Integumentary: Denies Rash. Denies Itching. Denies Dry skin.  Neurologic: Denies Dizziness. Denies Fainting. Denies Headache.  Psychiatric: Denies Depression. Denies Anxiety. Denies Suicidal/Homicidal ideations.    All Other ROS: Negative except as stated in HPI.       Objective:     /82 (BP Location: Left arm, Patient Position: Sitting, BP Method: Large (Automatic))   Pulse 74   Temp 98 °F (36.7 °C) (Oral)   Resp 18   Ht 6' 2" (1.88 m)   Wt 126.1 kg (278 lb)   SpO2 97%   BMI 35.69 kg/m²     Physical Exam    General: Alert and oriented, No acute distress. Obese.   Head: Normocephalic, Atraumatic.  Eye: Pupils are equal, round and reactive to light, Extraocular movements are intact, Sclera " non-icteric.  Ears/Nose/Throat: Normal, Mucosa moist,Clear.  Neck/Thyroid: Supple, Non-tender, No carotid bruit, No palpable thyromegaly or thyroid nodule, No lymphadenopathy, No JVD, Full range of motion.  Respiratory: Clear to auscultation bilaterally; No wheezes, rales or rhonchi,Non-labored respirations, Symmetrical chest wall expansion.  Cardiovascular: Regular rate and rhythm, S1/S2 normal, No murmurs, rubs or gallops.  Gastrointestinal: Soft, Non-tender, Non-distended, Normal bowel sounds, No palpable organomegaly.  Musculoskeletal: Normal range of motion.  Integumentary: Warm, Dry, Intact, No suspicious lesions or rashes.  Extremities: No clubbing, cyanosis or edema  Neurologic: No focal deficits, Cranial Nerves II-XII are grossly intact, Motor strength normal upper and lower extremities, Sensory exam intact.  Psychiatric: Normal interaction, Coherent speech, Euthymic mood, Appropriate affect         Assessment:       ICD-10-CM ICD-9-CM   1. Insulin resistance  E88.81 277.7        Plan:     Problem List Items Addressed This Visit    None  Visit Diagnoses       Insulin resistance    -  Primary    Relevant Medications    semaglutide (RYBELSUS) 7 mg tablet         1. Insulin resistance  - semaglutide (RYBELSUS) 7 mg tablet; Take 1 tablet (7 mg total) by mouth once daily.  Dispense: 30 tablet; Refill: 1  - Diet, exercise, and 10% weight loss encouraged. Rx trial of Rybelsus increased dose to 7mg daily with close monitoring to help with insulin resistance and obesity. Will titrate Rx Rybelsus as needed/tolerated until max dose achieved. Notify M.D. or ER if symptoms persist or worsen, adverse Rx side effects, temp greater than 100.4, or any acute illness.    Body mass index is 35.69 kg/m².  Goal BMI <30.  Exercise 5 times a week for 30 minutes per day.  Avoid soda, simple sugars, excessive rice, potatoes or bread. Limit fast foods and fried foods.  Choose complex carbs in moderation (example: green vegetables,  beans, oatmeal). Eat plenty of fresh fruits and vegetables with lean meats daily.  Do not skip meals. Eat a balanced portion size.  Avoid fad diets. Consider permanent healthy life style changes.      Isidoro was seen today for follow-up.    Diagnoses and all orders for this visit:    Insulin resistance  -     semaglutide (RYBELSUS) 7 mg tablet; Take 1 tablet (7 mg total) by mouth once daily.          Medication List with Changes/Refills   New Medications    SEMAGLUTIDE (RYBELSUS) 7 MG TABLET    Take 1 tablet (7 mg total) by mouth once daily.       Start Date: 6/20/2023 End Date: 8/19/2023   Current Medications    ATORVASTATIN (LIPITOR) 10 MG TABLET    Take 10 mg by mouth every evening.       Start Date: 4/23/2022 End Date: --    CIPROFLOXACIN HCL (CIPRO) 500 MG TABLET    Take 500 mg by mouth 2 (two) times daily.       Start Date: 11/27/2022End Date: --    DICLOFENAC (VOLTAREN) 75 MG EC TABLET    Take 75 mg by mouth 2 (two) times daily as needed.       Start Date: 1/25/2022 End Date: --    FEBUXOSTAT (ULORIC) 80 MG TAB    Take 80 mg by mouth as needed.       Start Date: 2/18/2022 End Date: --    FUROSEMIDE (LASIX) 40 MG TABLET    TAKE 1 TABLET BY MOUTH EVERY DAY AS NEEDED FOR SWELLING       Start Date: 8/15/2022 End Date: --    GABAPENTIN (NEURONTIN) 300 MG CAPSULE    Take 1 capsule (300 mg total) by mouth 3 (three) times daily.       Start Date: 6/1/2023  End Date: 11/28/2023    GUAIFENESIN (MUCINEX) 600 MG 12 HR TABLET    600 mg as needed.       Start Date: --        End Date: --    HYDROCHLOROTHIAZIDE (MICROZIDE) 12.5 MG CAPSULE    Take 12.5 mg by mouth.       Start Date: 9/27/2022 End Date: --    HYDROXYZINE HCL (ATARAX) 25 MG TABLET    TAKE 1 TABLET BY MOUTH EVERY DAY AT BEDTIME AS NEEDED FOR INSOMNIA       Start Date: 10/4/2022 End Date: --    LEVOTHYROXINE (SYNTHROID) 25 MCG TABLET    Take 0.5 tablets (12.5 mcg total) by mouth before breakfast.       Start Date: 5/26/2023 End Date: --    LORATADINE (CLARITIN)  10 MG TABLET    Take 10 mg by mouth daily as needed.       Start Date: 10/4/2021 End Date: --    PANTOPRAZOLE (PROTONIX) 40 MG TABLET    Take 40 mg by mouth once daily.       Start Date: 6/5/2022  End Date: --   Discontinued Medications    PHENTERMINE (ADIPEX-P) 37.5 MG TABLET    Take 1 tablet (37.5 mg total) by mouth before breakfast.       Start Date: 5/26/2023 End Date: 6/20/2023    SEMAGLUTIDE (RYBELSUS) 3 MG TABLET    Take 1 tablet (3 mg total) by mouth once daily.       Start Date: 6/20/2023 End Date: 6/20/2023          Follow up in about 4 weeks (around 7/18/2023) for Insulin resistance followup.

## 2023-06-22 ENCOUNTER — PATIENT MESSAGE (OUTPATIENT)
Dept: FAMILY MEDICINE | Facility: CLINIC | Age: 68
End: 2023-06-22
Payer: MEDICARE

## 2023-07-31 RX ORDER — FUROSEMIDE 40 MG/1
40 TABLET ORAL DAILY PRN
Qty: 90 TABLET | Refills: 3 | Status: SHIPPED | OUTPATIENT
Start: 2023-07-31

## 2023-07-31 RX ORDER — FUROSEMIDE 40 MG/1
TABLET ORAL
Qty: 90 TABLET | Refills: 3 | Status: SHIPPED | OUTPATIENT
Start: 2023-07-31 | End: 2023-07-31 | Stop reason: SDUPTHER

## 2023-09-07 ENCOUNTER — PATIENT MESSAGE (OUTPATIENT)
Dept: RESEARCH | Facility: HOSPITAL | Age: 68
End: 2023-09-07
Payer: MEDICARE

## 2023-09-25 RX ORDER — HYDROXYZINE HYDROCHLORIDE 25 MG/1
25 TABLET, FILM COATED ORAL NIGHTLY
Qty: 90 TABLET | Refills: 3 | Status: SHIPPED | OUTPATIENT
Start: 2023-09-25

## 2023-10-09 ENCOUNTER — DOCUMENTATION ONLY (OUTPATIENT)
Dept: FAMILY MEDICINE | Facility: CLINIC | Age: 68
End: 2023-10-09

## 2023-10-09 ENCOUNTER — OFFICE VISIT (OUTPATIENT)
Dept: FAMILY MEDICINE | Facility: CLINIC | Age: 68
End: 2023-10-09
Payer: MEDICARE

## 2023-10-09 VITALS
BODY MASS INDEX: 36.31 KG/M2 | OXYGEN SATURATION: 97 % | HEIGHT: 73 IN | WEIGHT: 274 LBS | SYSTOLIC BLOOD PRESSURE: 139 MMHG | DIASTOLIC BLOOD PRESSURE: 87 MMHG | HEART RATE: 71 BPM

## 2023-10-09 DIAGNOSIS — E88.819 INSULIN RESISTANCE: Primary | ICD-10-CM

## 2023-10-09 DIAGNOSIS — R79.9 ABNORMAL FINDING OF BLOOD CHEMISTRY, UNSPECIFIED: ICD-10-CM

## 2023-10-09 DIAGNOSIS — M10.9 GOUT, UNSPECIFIED CAUSE, UNSPECIFIED CHRONICITY, UNSPECIFIED SITE: ICD-10-CM

## 2023-10-09 DIAGNOSIS — I10 PRIMARY HYPERTENSION: ICD-10-CM

## 2023-10-09 DIAGNOSIS — E03.9 HYPOTHYROIDISM, UNSPECIFIED TYPE: ICD-10-CM

## 2023-10-09 DIAGNOSIS — Z23 FLU VACCINE NEED: ICD-10-CM

## 2023-10-09 DIAGNOSIS — E78.49 ESSENTIAL FAMILIAL HYPERLIPIDEMIA: ICD-10-CM

## 2023-10-09 PROCEDURE — 99214 PR OFFICE/OUTPT VISIT, EST, LEVL IV, 30-39 MIN: ICD-10-PCS | Mod: ,,, | Performed by: FAMILY MEDICINE

## 2023-10-09 PROCEDURE — 99214 OFFICE O/P EST MOD 30 MIN: CPT | Mod: ,,, | Performed by: FAMILY MEDICINE

## 2023-10-09 PROCEDURE — 90694 VACC AIIV4 NO PRSRV 0.5ML IM: CPT | Mod: ,,, | Performed by: FAMILY MEDICINE

## 2023-10-09 PROCEDURE — 90694 PR FLU VACCINE, QAIIV, INACTIV, NO PRSV, 0.5 ML, IM: ICD-10-PCS | Mod: ,,, | Performed by: FAMILY MEDICINE

## 2023-10-09 PROCEDURE — G0008 PR ADMIN INFLUENZA VIRUS VAC: ICD-10-PCS | Mod: ,,, | Performed by: FAMILY MEDICINE

## 2023-10-09 PROCEDURE — G0008 ADMIN INFLUENZA VIRUS VAC: HCPCS | Mod: ,,, | Performed by: FAMILY MEDICINE

## 2023-10-09 NOTE — PROGRESS NOTES
Patient ID: [id number]     Chief Complaint: Follow-up (Patient is following up for insulin resistance) and Insulin resistant        HPI:     Isidoro Leal is a 68 y.o. male here today for a follow up insulin resistance.  - He is obese with insulin resistance, he has lost 4 pounds since last visit with Rybelsus, no side effects, but his insurance will not cover Rybelsus, would like to try Mounjaro or Ozempic if possible, he is not interested in surgical weight loss or nutrition referral. He denies chest pain, palpitations, or SI/HI. His insurance would not cover Wegovy. He denies family history of medullary thyroid cancer or MEN II and personal history of pancreatitis.   - He would like flu vaccine today.   - He also has a history of gout that is controlled with Uloric, he denies adverse Rx side effects.  -  HTN and hypercholesterolemia are well controlled with current treatment regimen, asymptomatic.   - Hypothyroidism is controlled with Rx, no side effects, asymptomatic.  -  He does see Cardiology (Dr. Riojas) as scheduled. He does followup with Spine Ortho (Dr. Jiménez) as scheduled. He does followup with ENT (Dr. Heladio Rodriguez) as scheduled. He does followup with Dermatology (Dr. Arora) for skin care.   - Patient is without any other complaints today.          ----------------------------  Allergy  Hypercholesterolemia  Hypogonadism in male  Insomnia  Obesity, unspecified  Vitamin D deficiency     Past Surgical History:   Procedure Laterality Date    APPENDECTOMY      SINUS SURGERY         Review of patient's allergies indicates:   Allergen Reactions    Montelukast Anaphylaxis    Clindamycin Other (See Comments)     Other reaction(s): Cutaneous eruption, Weal    Hydrocodone bitartrate     Levofloxacin Other (See Comments)    Penicillins     Propoxyphene n-acetaminophen        Outpatient Medications Marked as Taking for the 10/9/23 encounter (Office Visit) with Cara Fiore MD   Medication Sig Dispense  Refill    atorvastatin (LIPITOR) 10 MG tablet Take 10 mg by mouth every evening.      ciprofloxacin HCl (CIPRO) 500 MG tablet Take 500 mg by mouth 2 (two) times daily.      diclofenac (VOLTAREN) 75 MG EC tablet Take 75 mg by mouth 2 (two) times daily as needed.      febuxostat (ULORIC) 80 mg Tab Take 80 mg by mouth as needed.      furosemide (LASIX) 40 MG tablet Take 1 tablet (40 mg total) by mouth daily as needed. 90 tablet 3    gabapentin (NEURONTIN) 300 MG capsule Take 1 capsule (300 mg total) by mouth 3 (three) times daily. 90 capsule 5    guaiFENesin (MUCINEX) 600 mg 12 hr tablet 600 mg as needed.      hydroCHLOROthiazide (MICROZIDE) 12.5 mg capsule Take 12.5 mg by mouth.      hydrOXYzine HCL (ATARAX) 25 MG tablet TAKE 1 TABLET BY MOUTH AT BEDTIME AS NEEDED FOR INSOMNIA 90 tablet 3    levothyroxine (SYNTHROID) 25 MCG tablet Take 0.5 tablets (12.5 mcg total) by mouth before breakfast. 45 tablet 3    loratadine (CLARITIN) 10 mg tablet Take 10 mg by mouth daily as needed.      pantoprazole (PROTONIX) 40 MG tablet Take 40 mg by mouth once daily.       Current Facility-Administered Medications for the 10/9/23 encounter (Office Visit) with Cara Fiore MD   Medication Dose Route Frequency Provider Last Rate Last Admin    [COMPLETED] influenza 65up-adj (QUADRIVALENT ADJUVANTED PF) vaccine 0.5 mL  0.5 mL Intramuscular vaccine x 1 dose Cara Fiore MD   0.5 mL at 10/09/23 1440       Social History     Socioeconomic History    Marital status:    Tobacco Use    Smoking status: Never    Smokeless tobacco: Never   Substance and Sexual Activity    Alcohol use: Never    Drug use: Never    Sexual activity: Yes     Social Determinants of Health     Financial Resource Strain: Low Risk  (6/20/2023)    Overall Financial Resource Strain (CARDIA)     Difficulty of Paying Living Expenses: Not hard at all   Food Insecurity: No Food Insecurity (6/20/2023)    Hunger Vital Sign     Worried About Running Out of  Food in the Last Year: Never true     Ran Out of Food in the Last Year: Never true   Transportation Needs: No Transportation Needs (6/20/2023)    PRAPARE - Transportation     Lack of Transportation (Medical): No     Lack of Transportation (Non-Medical): No   Physical Activity: Sufficiently Active (6/20/2023)    Exercise Vital Sign     Days of Exercise per Week: 7 days     Minutes of Exercise per Session: 30 min   Stress: No Stress Concern Present (6/20/2023)    [employer] [place] [employer] - Occupational Stress Questionnaire     Feeling of Stress : Only a little   Social Connections: Socially Integrated (6/20/2023)    Social Connection and Isolation Panel [NHANES]     Frequency of Communication with Friends and Family: More than three times a week     Frequency of Social Gatherings with Friends and Family: More than three times a week     Attends Jainism Services: More than 4 times per year     Active Member of Clubs or Organizations: Yes     Attends Club or Organization Meetings: More than 4 times per year     Marital Status:    Housing Stability: Low Risk  (6/20/2023)    Housing Stability Vital Sign     Unable to Pay for Housing in the Last Year: No     Number of Places Lived in the Last Year: 1     Unstable Housing in the Last Year: No        Family History   Problem Relation Age of Onset    Hypertension Mother     Stroke Father     Hyperlipidemia Brother     Hyperlipidemia Brother     Hyperlipidemia Brother     Hyperlipidemia Brother         Subjective:       Review of Systems:    See HPI for details    Constitutional: Denies Change in appetite. Denies Chills. Denies Fever. Denies Night sweats.  Eye: Denies Blurred vision. Denies Discharge. Denies Eye pain.  ENT: Denies Decreased hearing. Denies Sore throat. Denies Swollen glands.  Respiratory: Denies Cough. Denies Shortness of breath. Denies Shortness of breath with exertion. Denies Wheezing.  Cardiovascular: Denies Chest pain at rest.  "Denies Chest pain with exertion. Denies Irregular heartbeat. Denies Palpitations.  Gastrointestinal: Denies Abdominal pain. Denies Diarrhea. Denies Nausea. Denies Vomiting. Denies Hematemesis or Hematochezia.  Genitourinary: Denies Dysuria. Denies Urinary frequency. Denies Urinary urgency. Denies Blood in urine.  Endocrine: Denies Cold intolerance. Denies Excessive thirst. Denies Heat intolerance. Denies Weight loss. Denies Weight gain.  Musculoskeletal: Denies Painful joints. Denies Weakness.  Integumentary: Denies Rash. Denies Itching. Denies Dry skin.  Neurologic: Denies Dizziness. Denies Fainting. Denies Headache.  Psychiatric: Denies Depression. Denies Anxiety. Denies Suicidal/Homicidal ideations.    All Other ROS: Negative except as stated in HPI.       Objective:     /87   Pulse 71   Ht 6' 1" (1.854 m)   Wt 124.3 kg (274 lb)   SpO2 97%   BMI 36.15 kg/m²     Physical Exam    General: Alert and oriented, No acute distress. Obese.   Head: Normocephalic, Atraumatic.  Eye: Pupils are equal, round and reactive to light, Extraocular movements are intact, Sclera non-icteric.  Ears/Nose/Throat: Normal, Mucosa moist,Clear.  Neck/Thyroid: Supple, Non-tender, No carotid bruit, No palpable thyromegaly or thyroid nodule, No lymphadenopathy, No JVD, Full range of motion.  Respiratory: Clear to auscultation bilaterally; No wheezes, rales or rhonchi,Non-labored respirations, Symmetrical chest wall expansion.  Cardiovascular: Regular rate and rhythm, S1/S2 normal, No murmurs, rubs or gallops.  Gastrointestinal: Soft, Non-tender, Non-distended, Normal bowel sounds, No palpable organomegaly.  Musculoskeletal: Normal range of motion.  Integumentary: Warm, Dry, Intact, No suspicious lesions or rashes.  Extremities: No clubbing, cyanosis or edema  Neurologic: No focal deficits, Cranial Nerves II-XII are grossly intact, Motor strength normal upper and lower extremities, Sensory exam intact.  Psychiatric: Normal " interaction, Coherent speech, Euthymic mood, Appropriate affect         Assessment:       ICD-10-CM ICD-9-CM   1. Insulin resistance  E88.819 277.7   2. Flu vaccine need  Z23 V04.81   3. Gout, unspecified cause, unspecified chronicity, unspecified site  M10.9 274.9   4. Essential familial hyperlipidemia  E78.49 272.2   5. Primary hypertension  I10 401.9   6. Hypothyroidism, unspecified type  E03.9 244.9   7. Abnormal finding of blood chemistry, unspecified  R79.9 790.6        Plan:     Problem List Items Addressed This Visit          Cardiac/Vascular    Essential familial hyperlipidemia    Relevant Orders    CK    Comprehensive Metabolic Panel    Lipid Panel     Other Visit Diagnoses       Insulin resistance    -  Primary    Relevant Medications    tirzepatide 2.5 mg/0.5 mL PnIj    Other Relevant Orders    Hemoglobin A1C    Flu vaccine need        Relevant Medications    influenza 65up-adj (QUADRIVALENT ADJUVANTED PF) vaccine 0.5 mL (Completed) (Start on 10/9/2023  3:10 PM)    Gout, unspecified cause, unspecified chronicity, unspecified site        Relevant Orders    Uric Acid    Primary hypertension        Relevant Orders    CBC Auto Differential    Comprehensive Metabolic Panel    Hypothyroidism, unspecified type        Relevant Orders    TSH    T4, Free    Abnormal finding of blood chemistry, unspecified        Relevant Orders    Hemoglobin A1C         1. Insulin resistance  - tirzepatide 2.5 mg/0.5 mL PnIj; Inject 2.5 mg into the skin every 7 days.  Dispense: 4 pen ; Refill: 0  - Hemoglobin A1C; Future  - Diet, exercise, and 10% weight loss encouraged. Rx trial of Mounjaro with close monitoring to help with insulin resistance and obesity. Will titrate Rx Mounjaro as needed/tolerated until max dose achieved. Notify M.D. or ER if symptoms persist or worsen, adverse Rx side effects, temp greater than 100.4, or any acute illness.    Body mass index is 36.15 kg/m².  Goal BMI <30.  Exercise 5 times a week for 30  minutes per day.  Avoid soda, simple sugars, excessive rice, potatoes or bread. Limit fast foods and fried foods.  Choose complex carbs in moderation (example: green vegetables, beans, oatmeal). Eat plenty of fresh fruits and vegetables with lean meats daily.  Do not skip meals. Eat a balanced portion size.  Avoid fad diets. Consider permanent healthy life style changes.      2. Flu vaccine need  - influenza 65up-adj (QUADRIVALENT ADJUVANTED PF) vaccine 0.5 mL IM x 1 today.    3. Gout, unspecified cause, unspecified chronicity, unspecified site  - Uric Acid; Future  - Will titrate Rx pending results. Gout dietary precautions encouraged. Notify M.D. or ER if symptoms persist or worsen, gout, temp >100.4, or any acute illness.      4. Essential familial hyperlipidemia  - CK; Future  - Comprehensive Metabolic Panel; Future  - Lipid Panel; Future  - Continue Rx as prescribed.   Continue  Stressed importance of dietary modifications. Follow a low cholesterol, low saturated fat diet with less that 200mg of cholesterol a day.  Avoid fried foods and high saturated fats (high saturated fats less than 7% of calories).  Add Flax Seed/Fish Oil supplements to diet. Increase dietary fiber.  Regular exercise can reduce LDL and raise HDL. Stressed importance of physical activity 5 times per week for 30 minutes per day.      5. Primary hypertension  - CBC Auto Differential; Future  - Comprehensive Metabolic Panel; Future  - BP is well controlled. Continue BP Rx as prescribed. Keep daily BP log. Continue followup with Cardiology as scheduled. Notify M.D. or ER if BP >170/100 or <90/60, chest pain, palpitations, headache, SOB, temp greater than 100.4, or any acute illness.   Continue  Low Sodium Diet (DASH Diet - Less than 2 grams of sodium per day).  Monitor blood pressure daily and log. Report consistent numbers greater than 140/90.  Smoking cessation encouraged to aid in BP reduction.  Maintain healthy weight with goal BMI <30.  Exercise 30 minutes per day, 5 days per week.      6. Hypothyroidism, unspecified type  - TSH; Future  - T4, Free; Future  - Continue Rx as prescribed for now, will titrate Rx pending results.     7. Abnormal finding of blood chemistry, unspecified  - Hemoglobin A1C; Future        Isidoro was seen today for follow-up and insulin resistant.    Diagnoses and all orders for this visit:    Insulin resistance  -     tirzepatide 2.5 mg/0.5 mL PnIj; Inject 2.5 mg into the skin every 7 days.  -     Hemoglobin A1C; Future    Flu vaccine need  -     influenza 65up-adj (QUADRIVALENT ADJUVANTED PF) vaccine 0.5 mL    Gout, unspecified cause, unspecified chronicity, unspecified site  -     Uric Acid; Future    Essential familial hyperlipidemia  -     CK; Future  -     Comprehensive Metabolic Panel; Future  -     Lipid Panel; Future    Primary hypertension  -     CBC Auto Differential; Future  -     Comprehensive Metabolic Panel; Future    Hypothyroidism, unspecified type  -     TSH; Future  -     T4, Free; Future    Abnormal finding of blood chemistry, unspecified  -     Hemoglobin A1C; Future          Medication List with Changes/Refills   New Medications    TIRZEPATIDE 2.5 MG/0.5 ML PNIJ    Inject 2.5 mg into the skin every 7 days.       Start Date: 10/9/2023 End Date: 12/8/2023   Current Medications    ATORVASTATIN (LIPITOR) 10 MG TABLET    Take 10 mg by mouth every evening.       Start Date: 4/23/2022 End Date: --    CIPROFLOXACIN HCL (CIPRO) 500 MG TABLET    Take 500 mg by mouth 2 (two) times daily.       Start Date: 11/27/2022End Date: --    DICLOFENAC (VOLTAREN) 75 MG EC TABLET    Take 75 mg by mouth 2 (two) times daily as needed.       Start Date: 1/25/2022 End Date: --    FEBUXOSTAT (ULORIC) 80 MG TAB    Take 80 mg by mouth as needed.       Start Date: 2/18/2022 End Date: --    FUROSEMIDE (LASIX) 40 MG TABLET    Take 1 tablet (40 mg total) by mouth daily as needed.       Start Date: 7/31/2023 End Date: --    GABAPENTIN  (NEURONTIN) 300 MG CAPSULE    Take 1 capsule (300 mg total) by mouth 3 (three) times daily.       Start Date: 6/1/2023  End Date: 11/28/2023    GUAIFENESIN (MUCINEX) 600 MG 12 HR TABLET    600 mg as needed.       Start Date: --        End Date: --    HYDROCHLOROTHIAZIDE (MICROZIDE) 12.5 MG CAPSULE    Take 12.5 mg by mouth.       Start Date: 9/27/2022 End Date: --    HYDROXYZINE HCL (ATARAX) 25 MG TABLET    TAKE 1 TABLET BY MOUTH AT BEDTIME AS NEEDED FOR INSOMNIA       Start Date: 9/25/2023 End Date: --    LEVOTHYROXINE (SYNTHROID) 25 MCG TABLET    Take 0.5 tablets (12.5 mcg total) by mouth before breakfast.       Start Date: 5/26/2023 End Date: --    LORATADINE (CLARITIN) 10 MG TABLET    Take 10 mg by mouth daily as needed.       Start Date: 10/4/2021 End Date: --    PANTOPRAZOLE (PROTONIX) 40 MG TABLET    Take 40 mg by mouth once daily.       Start Date: 6/5/2022  End Date: --          Follow up in about 4 weeks (around 11/6/2023) for Insulin resistance Followup.

## 2023-10-09 NOTE — PATIENT INSTRUCTIONS
Adryan Chaudhry,     If you are due for any health screening(s) below please notify me so we can arrange them to be ordered and scheduled. Most healthy patients at your age complete them, but you are free to accept or refuse.     If you can't do it, I'll definitely understand. If you can, I'd certainly appreciate it!    Tests to Keep You Healthy    Colon Cancer Screening: Met on 2/8/2023  Last Blood Pressure <= 139/89 (10/9/2023): NO      Lets manage your high blood pressure     Your blood pressure was above 140/90 today during your visit. We recommend that you schedule a nurse visit in two weeks to check your blood pressure and discuss ways to support your health goals.     You can also manage your health and record your blood pressure from the comfort of home by keeping a daily blood pressure log. These results are shared with and reviewed by your provider. Please print this form (Daily Blood Pressure Log) to assist you in keeping track of your blood pressure at home.     Schedule your nurse visit in two weeks to learn more about how to track and manage high blood pressure.    Daily Blood Pressure Log    Name:__________________________________                  Date of Birth:_________    Average Blood Pressure:  __________      Date: Time  (a.m.) Blood  Pressure: Pulse  Rate: Time  (p.m.) Blood  Pressure : Pulse  Rate:   Sample 8:37 127/83 84

## 2023-10-25 LAB
CHOLESTEROL (LIPID PANEL): 147
HBA1C MFR BLD: 5.5 % (ref 4–6)
HDLC SERPL-MCNC: 43 MG/DL
LDLC SERPL CALC-MCNC: 79 MG/DL
TRIGL SERPL-MCNC: 150 MG/DL

## 2023-10-26 ENCOUNTER — DOCUMENTATION ONLY (OUTPATIENT)
Dept: FAMILY MEDICINE | Facility: CLINIC | Age: 68
End: 2023-10-26
Payer: MEDICARE

## 2023-10-30 ENCOUNTER — TELEPHONE (OUTPATIENT)
Dept: FAMILY MEDICINE | Facility: CLINIC | Age: 68
End: 2023-10-30
Payer: MEDICARE

## 2023-10-30 NOTE — TELEPHONE ENCOUNTER
----- Message from Cara Fiore MD sent at 10/26/2023  5:11 PM CDT -----  Cholesterol is well controlled, continue cholesterol Rx as prescribed, recheck CMP, FLP, CPK in 04/2024. Pre-diabetes is controlled, HgA1C is 5.5%, normal is <5.7%, diabetes starts at 6.5% or higher, needs to follow American Diabetic Association Diet, exercise, and 10% weight loss, recheck CMP, HgA1c with annual wellness labs. Gout is well controlled, continue current treatment plan, recheck CMP, uric acid in 04/2024. Thyroid is well controlled, continue thyroid Rx as prescribed, recheck TSH, free T4 in 04//2024. Remaining labs are essentially normal.

## 2023-11-29 ENCOUNTER — OFFICE VISIT (OUTPATIENT)
Dept: FAMILY MEDICINE | Facility: CLINIC | Age: 68
End: 2023-11-29
Payer: MEDICARE

## 2023-11-29 VITALS
HEART RATE: 85 BPM | SYSTOLIC BLOOD PRESSURE: 120 MMHG | BODY MASS INDEX: 36.18 KG/M2 | TEMPERATURE: 98 F | DIASTOLIC BLOOD PRESSURE: 77 MMHG | WEIGHT: 273 LBS | OXYGEN SATURATION: 97 % | HEIGHT: 73 IN

## 2023-11-29 DIAGNOSIS — E66.01 CLASS 2 SEVERE OBESITY DUE TO EXCESS CALORIES WITH SERIOUS COMORBIDITY AND BODY MASS INDEX (BMI) OF 36.0 TO 36.9 IN ADULT: ICD-10-CM

## 2023-11-29 DIAGNOSIS — E88.819 INSULIN RESISTANCE: Primary | ICD-10-CM

## 2023-11-29 PROCEDURE — 99213 OFFICE O/P EST LOW 20 MIN: CPT | Mod: ,,, | Performed by: FAMILY MEDICINE

## 2023-11-29 PROCEDURE — 99213 PR OFFICE/OUTPT VISIT, EST, LEVL III, 20-29 MIN: ICD-10-PCS | Mod: ,,, | Performed by: FAMILY MEDICINE

## 2023-11-29 NOTE — PROGRESS NOTES
Patient ID: [id number]     Chief Complaint: Follow-up (Patient is following up for Insulin Resistance)        HPI:     Isidoro Leal is a 68 y.o. male here today for a follow up.   - He is obese with insulin resistance, he has lost 1 pound since last visit with Mounjaro, no side effects, but his insurance will not cover Rybelsus, would like to try increased dose of Mounjaro if possible, he is not interested in surgical weight loss or nutrition referral. He denies chest pain, palpitations, or SI/HI. His insurance would not cover Wegovy. He denies family history of medullary thyroid cancer or MEN II and personal history of pancreatitis.    - He will get RSV vaccine from his pharmacy.  - Patient is without any other complaints today.     ----------------------------  Allergy  Hypercholesterolemia  Hypogonadism in male  Insomnia  Obesity, unspecified  Vitamin D deficiency     Past Surgical History:   Procedure Laterality Date    APPENDECTOMY      SINUS SURGERY         Review of patient's allergies indicates:   Allergen Reactions    Montelukast Anaphylaxis    Clindamycin Other (See Comments)     Other reaction(s): Cutaneous eruption, Weal    Hydrocodone bitartrate     Levofloxacin Other (See Comments)    Penicillins     Propoxyphene n-acetaminophen        Outpatient Medications Marked as Taking for the 11/29/23 encounter (Office Visit) with Cara Fiore MD   Medication Sig Dispense Refill    atorvastatin (LIPITOR) 10 MG tablet Take 10 mg by mouth every evening.      ciprofloxacin HCl (CIPRO) 500 MG tablet Take 500 mg by mouth 2 (two) times daily.      diclofenac (VOLTAREN) 75 MG EC tablet Take 75 mg by mouth 2 (two) times daily as needed.      febuxostat (ULORIC) 80 mg Tab Take 80 mg by mouth as needed.      furosemide (LASIX) 40 MG tablet Take 1 tablet (40 mg total) by mouth daily as needed. 90 tablet 3    guaiFENesin (MUCINEX) 600 mg 12 hr tablet 600 mg as needed.      hydroCHLOROthiazide (MICROZIDE) 12.5 mg  capsule Take 12.5 mg by mouth.      hydrOXYzine HCL (ATARAX) 25 MG tablet TAKE 1 TABLET BY MOUTH AT BEDTIME AS NEEDED FOR INSOMNIA 90 tablet 3    levothyroxine (SYNTHROID) 25 MCG tablet Take 0.5 tablets (12.5 mcg total) by mouth before breakfast. 45 tablet 3    loratadine (CLARITIN) 10 mg tablet Take 10 mg by mouth daily as needed.      pantoprazole (PROTONIX) 40 MG tablet Take 40 mg by mouth once daily.      [DISCONTINUED] tirzepatide 2.5 mg/0.5 mL PnIj Inject 2.5 mg into the skin every 7 days. 4 pen 0       Social History     Socioeconomic History    Marital status:    Tobacco Use    Smoking status: Never    Smokeless tobacco: Never   Substance and Sexual Activity    Alcohol use: Never    Drug use: Never    Sexual activity: Yes     Social Determinants of Health     Financial Resource Strain: Low Risk  (6/20/2023)    Overall Financial Resource Strain (CARDIA)     Difficulty of Paying Living Expenses: Not hard at all   Food Insecurity: No Food Insecurity (6/20/2023)    Hunger Vital Sign     Worried About Running Out of Food in the Last Year: Never true     Ran Out of Food in the Last Year: Never true   Transportation Needs: No Transportation Needs (6/20/2023)    PRAPARE - Transportation     Lack of Transportation (Medical): No     Lack of Transportation (Non-Medical): No   Physical Activity: Sufficiently Active (6/20/2023)    Exercise Vital Sign     Days of Exercise per Week: 7 days     Minutes of Exercise per Session: 30 min   Stress: No Stress Concern Present (6/20/2023)    [employer] - Occupational Stress Questionnaire     Feeling of Stress : Only a little   Social Connections: Socially Integrated (6/20/2023)    Social Connection and Isolation Panel [NHANES]     Frequency of Communication with Friends and Family: More than three times a week     Frequency of Social Gatherings with Friends and Family: More than three times a week     Attends Denominational Services: More than 4  times per year     Active Member of Clubs or Organizations: Yes     Attends Club or Organization Meetings: More than 4 times per year     Marital Status:    Housing Stability: Low Risk  (6/20/2023)    Housing Stability Vital Sign     Unable to Pay for Housing in the Last Year: No     Number of Places Lived in the Last Year: 1     Unstable Housing in the Last Year: No        Family History   Problem Relation Age of Onset    Hypertension Mother     Stroke Father     Hyperlipidemia Brother     Hyperlipidemia Brother     Hyperlipidemia Brother     Hyperlipidemia Brother         Subjective:       Review of Systems:    See HPI for details    Constitutional: Denies Change in appetite. Denies Chills. Denies Fever. Denies Night sweats.  Eye: Denies Blurred vision. Denies Discharge. Denies Eye pain.  ENT: Denies Decreased hearing. Denies Sore throat. Denies Swollen glands.  Respiratory: Denies Cough. Denies Shortness of breath. Denies Shortness of breath with exertion. Denies Wheezing.  Cardiovascular: Denies Chest pain at rest. Denies Chest pain with exertion. Denies Irregular heartbeat. Denies Palpitations.  Gastrointestinal: Denies Abdominal pain. Denies Diarrhea. Denies Nausea. Denies Vomiting. Denies Hematemesis or Hematochezia.  Genitourinary: Denies Dysuria. Denies Urinary frequency. Denies Urinary urgency. Denies Blood in urine.  Endocrine: Denies Cold intolerance. Denies Excessive thirst. Denies Heat intolerance. Denies Weight loss. Denies Weight gain.  Musculoskeletal: Denies Painful joints. Denies Weakness.  Integumentary: Denies Rash. Denies Itching. Denies Dry skin.  Neurologic: Denies Dizziness. Denies Fainting. Denies Headache.  Psychiatric: Denies Depression. Denies Anxiety. Denies Suicidal/Homicidal ideations.    All Other ROS: Negative except as stated in HPI.       Objective:     /77 (BP Location: Right forearm, Patient Position: Sitting, BP Method: Large (Automatic))   Pulse 85   Temp 98.4  "°F (36.9 °C)   Ht 6' 1" (1.854 m)   Wt 123.8 kg (273 lb)   SpO2 97%   BMI 36.02 kg/m²     Physical Exam    General: Alert and oriented, No acute distress. Obese.   Head: Normocephalic, Atraumatic.  Eye: Pupils are equal, round and reactive to light, Extraocular movements are intact, Sclera non-icteric.  Ears/Nose/Throat: Normal, Mucosa moist,Clear.  Neck/Thyroid: Supple, Non-tender, No carotid bruit, No palpable thyromegaly or thyroid nodule, No lymphadenopathy, No JVD, Full range of motion.  Respiratory: Clear to auscultation bilaterally; No wheezes, rales or rhonchi,Non-labored respirations, Symmetrical chest wall expansion.  Cardiovascular: Regular rate and rhythm, S1/S2 normal, No murmurs, rubs or gallops.  Gastrointestinal: Soft, Non-tender, Non-distended, Normal bowel sounds, No palpable organomegaly.  Musculoskeletal: Normal range of motion.  Integumentary: Warm, Dry, Intact, No suspicious lesions or rashes.  Extremities: No clubbing, cyanosis or edema  Neurologic: No focal deficits, Cranial Nerves II-XII are grossly intact, Motor strength normal upper and lower extremities, Sensory exam intact.  Psychiatric: Normal interaction, Coherent speech, Euthymic mood, Appropriate affect         Assessment:       ICD-10-CM ICD-9-CM   1. Insulin resistance  E88.819 277.7   2. Class 2 severe obesity due to excess calories with serious comorbidity and body mass index (BMI) of 36.0 to 36.9 in adult  E66.01 278.01    Z68.36 V85.36        Plan:     Problem List Items Addressed This Visit    None  Visit Diagnoses       Insulin resistance    -  Primary    Relevant Medications    tirzepatide 5 mg/0.5 mL PnIj    Class 2 severe obesity due to excess calories with serious comorbidity and body mass index (BMI) of 36.0 to 36.9 in adult        Relevant Medications    tirzepatide 5 mg/0.5 mL PnIj         1. Insulin resistance  - tirzepatide 5 mg/0.5 mL PnIj; Inject 5 mg into the skin every 7 days. Would like Zepbound if " available.  Dispense: 4 pen ; Refill: 1  - Diet, exercise, and 10% weight loss encouraged. Rx trial of increased dose of Mounjaro/Zepbound to 5mg weekly with close monitoring to help with insulin resistance and obesity. Will titrate Rx Mounjaro/Zepbound as needed/tolerated until max dose achieved. Notify M.D. or ER if symptoms persist or worsen, adverse Rx side effects, temp greater than 100.4, or any acute illness.     Body mass index is 36.02 kg/m².  Goal BMI <30.  Exercise 5 times a week for 30 minutes per day.  Avoid soda, simple sugars, excessive rice, potatoes or bread. Limit fast foods and fried foods.  Choose complex carbs in moderation (example: green vegetables, beans, oatmeal). Eat plenty of fresh fruits and vegetables with lean meats daily.  Do not skip meals. Eat a balanced portion size.  Avoid fad diets. Consider permanent healthy life style changes.      2. Class 2 severe obesity due to excess calories with serious comorbidity and body mass index (BMI) of 36.0 to 36.9 in adult  - tirzepatide 5 mg/0.5 mL PnIj; Inject 5 mg into the skin every 7 days. Would like Zepbound if available.  Dispense: 4 pen ; Refill: 1  - Same as #1.       Isidoro was seen today for follow-up.    Diagnoses and all orders for this visit:    Insulin resistance  -     tirzepatide 5 mg/0.5 mL PnIj; Inject 5 mg into the skin every 7 days. Would like Zepbound if available.    Class 2 severe obesity due to excess calories with serious comorbidity and body mass index (BMI) of 36.0 to 36.9 in adult  -     tirzepatide 5 mg/0.5 mL PnIj; Inject 5 mg into the skin every 7 days. Would like Zepbound if available.          Medication List with Changes/Refills   New Medications    TIRZEPATIDE 5 MG/0.5 ML PNIJ    Inject 5 mg into the skin every 7 days. Would like Zepbound if available.       Start Date: 11/29/2023End Date: 1/28/2024   Current Medications    ATORVASTATIN (LIPITOR) 10 MG TABLET    Take 10 mg by mouth every evening.       Start  Date: 4/23/2022 End Date: --    CIPROFLOXACIN HCL (CIPRO) 500 MG TABLET    Take 500 mg by mouth 2 (two) times daily.       Start Date: 11/27/2022End Date: --    DICLOFENAC (VOLTAREN) 75 MG EC TABLET    Take 75 mg by mouth 2 (two) times daily as needed.       Start Date: 1/25/2022 End Date: --    FEBUXOSTAT (ULORIC) 80 MG TAB    Take 80 mg by mouth as needed.       Start Date: 2/18/2022 End Date: --    FUROSEMIDE (LASIX) 40 MG TABLET    Take 1 tablet (40 mg total) by mouth daily as needed.       Start Date: 7/31/2023 End Date: --    GABAPENTIN (NEURONTIN) 300 MG CAPSULE    Take 1 capsule (300 mg total) by mouth 3 (three) times daily.       Start Date: 6/1/2023  End Date: 11/28/2023    GUAIFENESIN (MUCINEX) 600 MG 12 HR TABLET    600 mg as needed.       Start Date: --        End Date: --    HYDROCHLOROTHIAZIDE (MICROZIDE) 12.5 MG CAPSULE    Take 12.5 mg by mouth.       Start Date: 9/27/2022 End Date: --    HYDROXYZINE HCL (ATARAX) 25 MG TABLET    TAKE 1 TABLET BY MOUTH AT BEDTIME AS NEEDED FOR INSOMNIA       Start Date: 9/25/2023 End Date: --    LEVOTHYROXINE (SYNTHROID) 25 MCG TABLET    Take 0.5 tablets (12.5 mcg total) by mouth before breakfast.       Start Date: 5/26/2023 End Date: --    LORATADINE (CLARITIN) 10 MG TABLET    Take 10 mg by mouth daily as needed.       Start Date: 10/4/2021 End Date: --    PANTOPRAZOLE (PROTONIX) 40 MG TABLET    Take 40 mg by mouth once daily.       Start Date: 6/5/2022  End Date: --   Discontinued Medications    TIRZEPATIDE 2.5 MG/0.5 ML PNIJ    Inject 2.5 mg into the skin every 7 days.       Start Date: 10/9/2023 End Date: 11/29/2023          Follow up in about 4 weeks (around 12/27/2023) for Insulin resistance/Obesity Followup.

## 2023-11-29 NOTE — PATIENT INSTRUCTIONS
Adryan Chaudhry,     If you are due for any health screening(s) below please notify me so we can arrange them to be ordered and scheduled. Most healthy patients at your age complete them, but you are free to accept or refuse.     If you can't do it, I'll definitely understand. If you can, I'd certainly appreciate it!    All of your core healthy metrics are met.

## 2023-12-04 ENCOUNTER — PATIENT MESSAGE (OUTPATIENT)
Dept: FAMILY MEDICINE | Facility: CLINIC | Age: 68
End: 2023-12-04
Payer: MEDICARE

## 2023-12-26 ENCOUNTER — PATIENT MESSAGE (OUTPATIENT)
Dept: FAMILY MEDICINE | Facility: CLINIC | Age: 68
End: 2023-12-26
Payer: MEDICARE

## 2023-12-26 DIAGNOSIS — E66.01 CLASS 2 SEVERE OBESITY DUE TO EXCESS CALORIES WITH SERIOUS COMORBIDITY AND BODY MASS INDEX (BMI) OF 36.0 TO 36.9 IN ADULT: Primary | ICD-10-CM

## 2024-01-03 RX ORDER — TIRZEPATIDE 5 MG/.5ML
5 INJECTION, SOLUTION SUBCUTANEOUS
Qty: 4 PEN | Refills: 1 | Status: SHIPPED | OUTPATIENT
Start: 2024-01-03 | End: 2024-01-31 | Stop reason: SDUPTHER

## 2024-01-31 DIAGNOSIS — E66.01 CLASS 2 SEVERE OBESITY DUE TO EXCESS CALORIES WITH SERIOUS COMORBIDITY AND BODY MASS INDEX (BMI) OF 36.0 TO 36.9 IN ADULT: ICD-10-CM

## 2024-01-31 RX ORDER — TIRZEPATIDE 5 MG/.5ML
5 INJECTION, SOLUTION SUBCUTANEOUS
Qty: 4 PEN | Refills: 1 | Status: SHIPPED | OUTPATIENT
Start: 2024-01-31 | End: 2024-01-31 | Stop reason: SDUPTHER

## 2024-01-31 RX ORDER — TIRZEPATIDE 5 MG/.5ML
5 INJECTION, SOLUTION SUBCUTANEOUS
Qty: 4 PEN | Refills: 1 | Status: SHIPPED | OUTPATIENT
Start: 2024-01-31 | End: 2024-02-06

## 2024-02-06 ENCOUNTER — OFFICE VISIT (OUTPATIENT)
Dept: FAMILY MEDICINE | Facility: CLINIC | Age: 69
End: 2024-02-06
Payer: MEDICARE

## 2024-02-06 VITALS
HEART RATE: 75 BPM | HEIGHT: 73 IN | WEIGHT: 259 LBS | BODY MASS INDEX: 34.33 KG/M2 | SYSTOLIC BLOOD PRESSURE: 136 MMHG | DIASTOLIC BLOOD PRESSURE: 84 MMHG | OXYGEN SATURATION: 99 % | RESPIRATION RATE: 17 BRPM

## 2024-02-06 DIAGNOSIS — E66.09 CLASS 1 OBESITY DUE TO EXCESS CALORIES WITH SERIOUS COMORBIDITY AND BODY MASS INDEX (BMI) OF 34.0 TO 34.9 IN ADULT: ICD-10-CM

## 2024-02-06 DIAGNOSIS — Z12.5 SCREENING PSA (PROSTATE SPECIFIC ANTIGEN): ICD-10-CM

## 2024-02-06 DIAGNOSIS — E88.819 INSULIN RESISTANCE: Primary | ICD-10-CM

## 2024-02-06 DIAGNOSIS — E78.49 ESSENTIAL FAMILIAL HYPERLIPIDEMIA: ICD-10-CM

## 2024-02-06 DIAGNOSIS — I10 PRIMARY HYPERTENSION: ICD-10-CM

## 2024-02-06 DIAGNOSIS — E29.1 MALE HYPOGONADISM: ICD-10-CM

## 2024-02-06 DIAGNOSIS — M10.9 GOUT, UNSPECIFIED CAUSE, UNSPECIFIED CHRONICITY, UNSPECIFIED SITE: ICD-10-CM

## 2024-02-06 DIAGNOSIS — E88.819 INSULIN RESISTANCE: ICD-10-CM

## 2024-02-06 DIAGNOSIS — E03.9 HYPOTHYROIDISM, UNSPECIFIED TYPE: ICD-10-CM

## 2024-02-06 DIAGNOSIS — Z00.00 WELLNESS EXAMINATION: Primary | ICD-10-CM

## 2024-02-06 DIAGNOSIS — R79.9 ABNORMAL FINDING OF BLOOD CHEMISTRY, UNSPECIFIED: ICD-10-CM

## 2024-02-06 PROCEDURE — 99213 OFFICE O/P EST LOW 20 MIN: CPT | Mod: ,,, | Performed by: FAMILY MEDICINE

## 2024-02-06 RX ORDER — TIRZEPATIDE 7.5 MG/.5ML
7.5 INJECTION, SOLUTION SUBCUTANEOUS
Qty: 4 PEN | Refills: 1 | Status: SHIPPED | OUTPATIENT
Start: 2024-02-06 | End: 2024-02-28 | Stop reason: SDUPTHER

## 2024-02-06 NOTE — PROGRESS NOTES
Patient ID: [id number]     Chief Complaint: Insulin Resistance and Obesity        HPI:     Isidoro Leal is a 68 y.o. male here today for a follow up insulin resistance/obesity.   - He is obese with insulin resistance, he has lost 14 pound since last visit with Zepbound, no side effects, but his insurance will not cover Rybelsus, would like to try increased dose of Zepbound if possible, he is not interested in surgical weight loss or nutrition referral. He denies chest pain, palpitations, or SI/HI. His insurance would not cover Wegovy. He denies family history of medullary thyroid cancer or MEN II and personal history of pancreatitis.    - HLD is controlled with Rx, no side effects, asymptomatic, he would like to be enrolled with digital medicine program.   - Patient is without any other complaints today.         ----------------------------  Allergy  Hypercholesterolemia  Hypogonadism in male  Insomnia  Obesity, unspecified  Vitamin D deficiency     Past Surgical History:   Procedure Laterality Date    APPENDECTOMY      SINUS SURGERY         Review of patient's allergies indicates:   Allergen Reactions    Montelukast Anaphylaxis    Clindamycin Other (See Comments)     Other reaction(s): Cutaneous eruption, Weal    Hydrocodone bitartrate     Levofloxacin Other (See Comments)    Penicillins     Propoxyphene n-acetaminophen        Outpatient Medications Marked as Taking for the 2/6/24 encounter (Office Visit) with Cara Fiore MD   Medication Sig Dispense Refill    atorvastatin (LIPITOR) 10 MG tablet Take 10 mg by mouth every evening.      ciprofloxacin HCl (CIPRO) 500 MG tablet Take 500 mg by mouth 2 (two) times daily.      diclofenac (VOLTAREN) 75 MG EC tablet Take 75 mg by mouth 2 (two) times daily as needed.      febuxostat (ULORIC) 80 mg Tab Take 80 mg by mouth as needed.      furosemide (LASIX) 40 MG tablet Take 1 tablet (40 mg total) by mouth daily as needed. 90 tablet 3    guaiFENesin (MUCINEX) 600 mg  12 hr tablet 600 mg as needed.      hydroCHLOROthiazide (MICROZIDE) 12.5 mg capsule Take 12.5 mg by mouth.      hydrOXYzine HCL (ATARAX) 25 MG tablet TAKE 1 TABLET BY MOUTH AT BEDTIME AS NEEDED FOR INSOMNIA 90 tablet 3    levothyroxine (SYNTHROID) 25 MCG tablet Take 0.5 tablets (12.5 mcg total) by mouth before breakfast. 45 tablet 3    loratadine (CLARITIN) 10 mg tablet Take 10 mg by mouth daily as needed.      pantoprazole (PROTONIX) 40 MG tablet Take 40 mg by mouth once daily.      [DISCONTINUED] tirzepatide, weight loss, (ZEPBOUND) 5 mg/0.5 mL PnIj Inject 5 mg into the skin every 7 days. 4 Pen 1       Social History     Socioeconomic History    Marital status:    Tobacco Use    Smoking status: Never    Smokeless tobacco: Never   Substance and Sexual Activity    Alcohol use: Never    Drug use: Never    Sexual activity: Yes     Partners: Female     Social Determinants of Health     Financial Resource Strain: Low Risk  (1/31/2024)    Overall Financial Resource Strain (CARDIA)     Difficulty of Paying Living Expenses: Not hard at all   Food Insecurity: No Food Insecurity (1/31/2024)    Hunger Vital Sign     Worried About Running Out of Food in the Last Year: Never true     Ran Out of Food in the Last Year: Never true   Transportation Needs: No Transportation Needs (1/31/2024)    PRAPARE - Transportation     Lack of Transportation (Medical): No     Lack of Transportation (Non-Medical): No   Physical Activity: Unknown (1/31/2024)    Exercise Vital Sign     Days of Exercise per Week: 0 days     Minutes of Exercise per Session: Patient declined   Stress: No Stress Concern Present (1/31/2024)    [employer] [place] [employer] - Occupational Stress Questionnaire     Feeling of Stress : Not at all   Social Connections: Socially Integrated (1/31/2024)    Social Connection and Isolation Panel [NHANES]     Frequency of Communication with Friends and Family: More than three times a week     Frequency of Social  Gatherings with Friends and Family: Once a week     Attends Denominational Services: More than 4 times per year     Active Member of Clubs or Organizations: Yes     Attends Club or Organization Meetings: More than 4 times per year     Marital Status:    Housing Stability: Low Risk  (2024)    Housing Stability Vital Sign     Unable to Pay for Housing in the Last Year: No     Number of Places Lived in the Last Year: 1     Unstable Housing in the Last Year: No        Family History   Problem Relation Age of Onset    Hypertension Mother             Stroke Father             Hyperlipidemia Brother     Heart disease Brother     Hyperlipidemia Brother     Heart disease Brother     Hyperlipidemia Brother     Hyperlipidemia Brother         Subjective:       Review of Systems:    See HPI for details    Constitutional: Denies Change in appetite. Denies Chills. Denies Fever. Denies Night sweats.  Eye: Denies Blurred vision. Denies Discharge. Denies Eye pain.  ENT: Denies Decreased hearing. Denies Sore throat. Denies Swollen glands.  Respiratory: Denies Cough. Denies Shortness of breath. Denies Shortness of breath with exertion. Denies Wheezing.  Cardiovascular: Denies Chest pain at rest. Denies Chest pain with exertion. Denies Irregular heartbeat. Denies Palpitations.  Gastrointestinal: Denies Abdominal pain. Denies Diarrhea. Denies Nausea. Denies Vomiting. Denies Hematemesis or Hematochezia.  Genitourinary: Denies Dysuria. Denies Urinary frequency. Denies Urinary urgency. Denies Blood in urine.  Endocrine: Denies Cold intolerance. Denies Excessive thirst. Denies Heat intolerance. Denies Weight loss. Denies Weight gain.  Musculoskeletal: Denies Painful joints. Denies Weakness.  Integumentary: Denies Rash. Denies Itching. Denies Dry skin.  Neurologic: Denies Dizziness. Denies Fainting. Denies Headache.  Psychiatric: Denies Depression. Denies Anxiety. Denies Suicidal/Homicidal ideations.    All Other ROS:  "Negative except as stated in HPI.       Objective:     /84 (BP Location: Right arm, Patient Position: Sitting, BP Method: Large (Automatic))   Pulse 75   Resp 17   Ht 6' 1" (1.854 m)   Wt 117.5 kg (259 lb)   SpO2 99%   BMI 34.17 kg/m²     Physical Exam    General: Alert and oriented, No acute distress. Obese.   Head: Normocephalic, Atraumatic.  Eye: Pupils are equal, round and reactive to light, Extraocular movements are intact, Sclera non-icteric.  Ears/Nose/Throat: Normal, Mucosa moist,Clear.  Neck/Thyroid: Supple, Non-tender, No carotid bruit, No palpable thyromegaly or thyroid nodule, No lymphadenopathy, No JVD, Full range of motion.  Respiratory: Clear to auscultation bilaterally; No wheezes, rales or rhonchi,Non-labored respirations, Symmetrical chest wall expansion.  Cardiovascular: Regular rate and rhythm, S1/S2 normal, No murmurs, rubs or gallops.  Gastrointestinal: Soft, Non-tender, Non-distended, Normal bowel sounds, No palpable organomegaly.  Musculoskeletal: Normal range of motion.  Integumentary: Warm, Dry, Intact, No suspicious lesions or rashes.  Extremities: No clubbing, cyanosis or edema  Neurologic: No focal deficits, Cranial Nerves II-XII are grossly intact, Motor strength normal upper and lower extremities, Sensory exam intact.  Psychiatric: Normal interaction, Coherent speech, Euthymic mood, Appropriate affect.        Assessment:       ICD-10-CM ICD-9-CM   1. Insulin resistance  E88.819 277.7   2. Class 1 obesity due to excess calories with serious comorbidity and body mass index (BMI) of 34.0 to 34.9 in adult  E66.09 278.00    Z68.34 V85.34   3. Essential familial hyperlipidemia  E78.49 272.2        Plan:     Problem List Items Addressed This Visit          Cardiac/Vascular    Essential familial hyperlipidemia    Relevant Orders    Lipids Digital Medicine (LDMP) Enrollment Order (Completed)    Lipids Digital Medicine (LDMP): Assign Onboarding Questionnaires (Completed)     Other " Visit Diagnoses       Insulin resistance    -  Primary    Relevant Medications    tirzepatide, weight loss, (ZEPBOUND) 7.5 mg/0.5 mL PnIj    Class 1 obesity due to excess calories with serious comorbidity and body mass index (BMI) of 34.0 to 34.9 in adult        Relevant Medications    tirzepatide, weight loss, (ZEPBOUND) 7.5 mg/0.5 mL PnIj         1. Insulin resistance  - tirzepatide, weight loss, (ZEPBOUND) 7.5 mg/0.5 mL PnIj; Inject 7.5 mg into the skin every 7 days.  Dispense: 4 Pen; Refill: 1  - Diet, exercise, and 10% weight loss encouraged. Rx trial of increased dose of Zepbound to 7.5mg weekly with close monitoring to help with insulin resistance and obesity. Will titrate Rx Zepbound as needed/tolerated until max dose achieved. Notify M.D. or ER if symptoms persist or worsen, adverse Rx side effects, temp greater than 100.4, or any acute illness.    Body mass index is 34.17 kg/m².  Goal BMI <30.  Exercise 5 times a week for 30 minutes per day.  Avoid soda, simple sugars, excessive rice, potatoes or bread. Limit fast foods and fried foods.  Choose complex carbs in moderation (example: green vegetables, beans, oatmeal). Eat plenty of fresh fruits and vegetables with lean meats daily.  Do not skip meals. Eat a balanced portion size.  Avoid fad diets. Consider permanent healthy life style changes.      2. Class 1 obesity due to excess calories with serious comorbidity and body mass index (BMI) of 34.0 to 34.9 in adult  - tirzepatide, weight loss, (ZEPBOUND) 7.5 mg/0.5 mL PnIj; Inject 7.5 mg into the skin every 7 days.  Dispense: 4 Pen; Refill: 1  - Same as #1.     3. Essential familial hyperlipidemia  - Lipids Digital Medicine (LDMP) Enrollment Order  - Lipids Digital Medicine (LDMP): Assign Onboarding Questionnaires  - Well controlled, continue Lipitor as prescribed.   Continue  Stressed importance of dietary modifications. Follow a low cholesterol, low saturated fat diet with less that 200mg of cholesterol a  day.  Avoid fried foods and high saturated fats (high saturated fats less than 7% of calories).  Add Flax Seed/Fish Oil supplements to diet. Increase dietary fiber.  Regular exercise can reduce LDL and raise HDL. Stressed importance of physical activity 5 times per week for 30 minutes per day.        Isidoro was seen today for insulin resistance and obesity.    Diagnoses and all orders for this visit:    Insulin resistance  -     tirzepatide, weight loss, (ZEPBOUND) 7.5 mg/0.5 mL PnIj; Inject 7.5 mg into the skin every 7 days.    Class 1 obesity due to excess calories with serious comorbidity and body mass index (BMI) of 34.0 to 34.9 in adult  -     tirzepatide, weight loss, (ZEPBOUND) 7.5 mg/0.5 mL PnIj; Inject 7.5 mg into the skin every 7 days.    Essential familial hyperlipidemia  -     Lipids Digital Medicine (LDMP) Enrollment Order  -     Lipids Digital Medicine (LDMP): Assign Onboarding Questionnaires          Medication List with Changes/Refills   New Medications    TIRZEPATIDE, WEIGHT LOSS, (ZEPBOUND) 7.5 MG/0.5 ML PNIJ    Inject 7.5 mg into the skin every 7 days.       Start Date: 2/6/2024  End Date: 4/6/2024   Current Medications    ATORVASTATIN (LIPITOR) 10 MG TABLET    Take 10 mg by mouth every evening.       Start Date: 4/23/2022 End Date: --    CIPROFLOXACIN HCL (CIPRO) 500 MG TABLET    Take 500 mg by mouth 2 (two) times daily.       Start Date: 11/27/2022End Date: --    DICLOFENAC (VOLTAREN) 75 MG EC TABLET    Take 75 mg by mouth 2 (two) times daily as needed.       Start Date: 1/25/2022 End Date: --    FEBUXOSTAT (ULORIC) 80 MG TAB    Take 80 mg by mouth as needed.       Start Date: 2/18/2022 End Date: --    FUROSEMIDE (LASIX) 40 MG TABLET    Take 1 tablet (40 mg total) by mouth daily as needed.       Start Date: 7/31/2023 End Date: --    GABAPENTIN (NEURONTIN) 300 MG CAPSULE    Take 1 capsule (300 mg total) by mouth 3 (three) times daily.       Start Date: 6/1/2023  End Date: 11/28/2023     GUAIFENESIN (MUCINEX) 600 MG 12 HR TABLET    600 mg as needed.       Start Date: --        End Date: --    HYDROCHLOROTHIAZIDE (MICROZIDE) 12.5 MG CAPSULE    Take 12.5 mg by mouth.       Start Date: 9/27/2022 End Date: --    HYDROXYZINE HCL (ATARAX) 25 MG TABLET    TAKE 1 TABLET BY MOUTH AT BEDTIME AS NEEDED FOR INSOMNIA       Start Date: 9/25/2023 End Date: --    LEVOTHYROXINE (SYNTHROID) 25 MCG TABLET    Take 0.5 tablets (12.5 mcg total) by mouth before breakfast.       Start Date: 5/26/2023 End Date: --    LORATADINE (CLARITIN) 10 MG TABLET    Take 10 mg by mouth daily as needed.       Start Date: 10/4/2021 End Date: --    PANTOPRAZOLE (PROTONIX) 40 MG TABLET    Take 40 mg by mouth once daily.       Start Date: 6/5/2022  End Date: --   Discontinued Medications    TIRZEPATIDE, WEIGHT LOSS, (ZEPBOUND) 5 MG/0.5 ML PNIJ    Inject 5 mg into the skin every 7 days.       Start Date: 1/31/2024 End Date: 2/6/2024          Follow up in about 4 weeks (around 3/5/2024) for Wellness, Obesity Followup.

## 2024-02-17 LAB
CHOLESTEROL (LIPID PANEL): 113
EGFR: 71
HBA1C MFR BLD: 5.3 % (ref 4–6)
HDLC SERPL-MCNC: 42 MG/DL
LDLC SERPL CALC-MCNC: 51 MG/DL
PSA: 0.94
TRIGL SERPL-MCNC: 118 MG/DL
TSH: 2.28

## 2024-02-20 ENCOUNTER — PATIENT MESSAGE (OUTPATIENT)
Dept: FAMILY MEDICINE | Facility: CLINIC | Age: 69
End: 2024-02-20
Payer: MEDICARE

## 2024-02-26 ENCOUNTER — PATIENT MESSAGE (OUTPATIENT)
Dept: ADMINISTRATIVE | Facility: OTHER | Age: 69
End: 2024-02-26
Payer: MEDICARE

## 2024-02-27 ENCOUNTER — DOCUMENTATION ONLY (OUTPATIENT)
Dept: FAMILY MEDICINE | Facility: CLINIC | Age: 69
End: 2024-02-27
Payer: MEDICARE

## 2024-02-28 ENCOUNTER — TELEPHONE (OUTPATIENT)
Dept: FAMILY MEDICINE | Facility: CLINIC | Age: 69
End: 2024-02-28
Payer: MEDICARE

## 2024-02-28 DIAGNOSIS — E66.09 CLASS 1 OBESITY DUE TO EXCESS CALORIES WITH SERIOUS COMORBIDITY AND BODY MASS INDEX (BMI) OF 34.0 TO 34.9 IN ADULT: ICD-10-CM

## 2024-02-28 DIAGNOSIS — E88.819 INSULIN RESISTANCE: ICD-10-CM

## 2024-02-28 RX ORDER — TIRZEPATIDE 7.5 MG/.5ML
7.5 INJECTION, SOLUTION SUBCUTANEOUS
Qty: 4 PEN | Refills: 1 | Status: SHIPPED | OUTPATIENT
Start: 2024-02-28 | End: 2024-03-22 | Stop reason: SDUPTHER

## 2024-02-28 NOTE — TELEPHONE ENCOUNTER
----- Message from Jony Zayas sent at 2/28/2024  8:58 AM CST -----  .Type:  Needs Medical Advice    Who Called: Isidoro  Symptoms (please be specific):    How long has patient had these symptoms:    Pharmacy name and phone #:    Would the patient rather a call back or a response via [facility]?   Best Call Back Number: [phone]  Additional Information: Patient was returning a call he missed from Ms. Herron, please call him back when available.

## 2024-02-28 NOTE — TELEPHONE ENCOUNTER
----- Message from Cara Fiore MD sent at 2/27/2024  7:38 PM CST -----  Cholesterol is well controlled, continue cholesterol Rx as prescribed, recheck CMP, FLP, CPK in 08/2024. Pre-diabetes is controlled, HgA1C is 5.3%, normal is <5.7%, diabetes starts at 6.5% or higher, needs to follow American Diabetic Association Diet, exercise, and 10% weight loss, recheck CMP, HgA1c with annual wellness labs. Gout is well controlled, continue current treatment plan, recheck CMP, uric acid in 08/2024. Thyroid is well controlled, continue thyroid Rx as prescribed, recheck TSH, free T4 in 08//2024. PSA is normal. Remaining labs are essentially normal.

## 2024-03-22 DIAGNOSIS — E66.09 CLASS 1 OBESITY DUE TO EXCESS CALORIES WITH SERIOUS COMORBIDITY AND BODY MASS INDEX (BMI) OF 34.0 TO 34.9 IN ADULT: ICD-10-CM

## 2024-03-22 DIAGNOSIS — E88.819 INSULIN RESISTANCE: ICD-10-CM

## 2024-03-24 DIAGNOSIS — E03.8 OTHER SPECIFIED HYPOTHYROIDISM: ICD-10-CM

## 2024-03-25 RX ORDER — TIRZEPATIDE 7.5 MG/.5ML
7.5 INJECTION, SOLUTION SUBCUTANEOUS
Qty: 4 PEN | Refills: 1 | Status: SHIPPED | OUTPATIENT
Start: 2024-03-25 | End: 2024-05-21

## 2024-03-25 RX ORDER — LEVOTHYROXINE SODIUM 25 UG/1
TABLET ORAL
Qty: 45 TABLET | Refills: 3 | Status: SHIPPED | OUTPATIENT
Start: 2024-03-25

## 2024-04-14 ENCOUNTER — PATIENT MESSAGE (OUTPATIENT)
Dept: FAMILY MEDICINE | Facility: CLINIC | Age: 69
End: 2024-04-14
Payer: MEDICARE

## 2024-05-14 ENCOUNTER — TELEPHONE (OUTPATIENT)
Dept: FAMILY MEDICINE | Facility: CLINIC | Age: 69
End: 2024-05-14
Payer: MEDICARE

## 2024-05-14 ENCOUNTER — PATIENT MESSAGE (OUTPATIENT)
Dept: FAMILY MEDICINE | Facility: CLINIC | Age: 69
End: 2024-05-14
Payer: MEDICARE

## 2024-05-14 DIAGNOSIS — I10 PRIMARY HYPERTENSION: ICD-10-CM

## 2024-05-14 DIAGNOSIS — Z00.00 LABORATORY EXAMINATION ORDERED AS PART OF A ROUTINE GENERAL MEDICAL EXAMINATION: Primary | ICD-10-CM

## 2024-05-14 DIAGNOSIS — Z12.5 SCREENING PSA (PROSTATE SPECIFIC ANTIGEN): ICD-10-CM

## 2024-05-14 DIAGNOSIS — R79.9 ABNORMAL FINDING OF BLOOD CHEMISTRY, UNSPECIFIED: ICD-10-CM

## 2024-05-14 NOTE — TELEPHONE ENCOUNTER
Are there any outstanding tasks in the patients's chart (ex.labs,MM,etc)?  no  Do we have outstanding/pending referrals?  no  Has the patient been seen in and ER,UCC, or been admitted since last visit?  no  Has the patient seen any other health care provider(doctors) since last visit?  no  Has the patient had any bloodwork or x-rays done since last visit?  no

## 2024-05-16 ENCOUNTER — PATIENT MESSAGE (OUTPATIENT)
Dept: FAMILY MEDICINE | Facility: CLINIC | Age: 69
End: 2024-05-16
Payer: MEDICARE

## 2024-05-16 ENCOUNTER — DOCUMENTATION ONLY (OUTPATIENT)
Dept: FAMILY MEDICINE | Facility: CLINIC | Age: 69
End: 2024-05-16
Payer: MEDICARE

## 2024-05-16 LAB
CHOLESTEROL (LIPID PANEL): 118
HBA1C MFR BLD: 5.2 % (ref 4–6)
HDLC SERPL-MCNC: 37 MG/DL
LDLC SERPL CALC-MCNC: 59 MG/DL
PSA: 1.06
TRIGL SERPL-MCNC: 137 MG/DL

## 2024-05-21 ENCOUNTER — TELEPHONE (OUTPATIENT)
Dept: FAMILY MEDICINE | Facility: CLINIC | Age: 69
End: 2024-05-21

## 2024-05-21 ENCOUNTER — PATIENT MESSAGE (OUTPATIENT)
Dept: FAMILY MEDICINE | Facility: CLINIC | Age: 69
End: 2024-05-21

## 2024-05-21 ENCOUNTER — OFFICE VISIT (OUTPATIENT)
Dept: FAMILY MEDICINE | Facility: CLINIC | Age: 69
End: 2024-05-21
Payer: MEDICARE

## 2024-05-21 VITALS
RESPIRATION RATE: 16 BRPM | SYSTOLIC BLOOD PRESSURE: 136 MMHG | HEART RATE: 72 BPM | OXYGEN SATURATION: 98 % | BODY MASS INDEX: 33.13 KG/M2 | DIASTOLIC BLOOD PRESSURE: 86 MMHG | HEIGHT: 73 IN | WEIGHT: 250 LBS

## 2024-05-21 DIAGNOSIS — E78.49 ESSENTIAL FAMILIAL HYPERLIPIDEMIA: ICD-10-CM

## 2024-05-21 DIAGNOSIS — I10 PRIMARY HYPERTENSION: ICD-10-CM

## 2024-05-21 DIAGNOSIS — Z00.00 MEDICARE ANNUAL WELLNESS VISIT, SUBSEQUENT: Primary | ICD-10-CM

## 2024-05-21 DIAGNOSIS — M10.9 GOUT, UNSPECIFIED CAUSE, UNSPECIFIED CHRONICITY, UNSPECIFIED SITE: ICD-10-CM

## 2024-05-21 DIAGNOSIS — E66.09 CLASS 1 OBESITY DUE TO EXCESS CALORIES WITH SERIOUS COMORBIDITY AND BODY MASS INDEX (BMI) OF 32.0 TO 32.9 IN ADULT: ICD-10-CM

## 2024-05-21 DIAGNOSIS — E03.9 HYPOTHYROIDISM, UNSPECIFIED TYPE: ICD-10-CM

## 2024-05-21 PROCEDURE — G0439 PPPS, SUBSEQ VISIT: HCPCS | Mod: ,,, | Performed by: FAMILY MEDICINE

## 2024-05-21 RX ORDER — TIRZEPATIDE 10 MG/.5ML
10 INJECTION, SOLUTION SUBCUTANEOUS
Qty: 2 ML | Refills: 1 | Status: SHIPPED | OUTPATIENT
Start: 2024-05-21 | End: 2024-06-13 | Stop reason: SDUPTHER

## 2024-05-21 NOTE — TELEPHONE ENCOUNTER
----- Message from Burt Maya sent at 5/21/2024  9:43 AM CDT -----  Type:  Pharmacy Calling to Clarify an RX    Name of Caller:[facility]   Pharmacy Name:[id number] - [place], [place] - [address] , [address]      Prescription Name:tirzepatide, weight loss, (ZEPBOUND) 10 mg/0.5 mL PnIj  What do they need to clarify?:Thai galeano  Best Call Back Number:[phone]    Additional Information: PA is needed --PA key: [id number]   Please obtain asap medication does not stay in stock long - pa needed before medication is filled

## 2024-05-21 NOTE — PROGRESS NOTES
Patient ID: [id number]     Chief Complaint: Medicare AWV        HPI:     Isidoro Leal is a 69 y.o. male here today for a Medicare Wellness.   Well Adult History   The patient presents for well adult exam, The patient's general health status is described as good. The patient's diet is described as balanced. Exercise: occasional. Associated symptoms consist of denies weight loss, denies weight gain, denies fatigue, denies headache, denies hearing loss and denies vision changes. Additional pertinent history: last dental exam: 01/2023 (with Dr. Ross, Dr. Ramírez Man), last eye exam: 02/2022, with [facility], last Cologuard: 02/08/2023 (Negative), needs Cologuard in 02/2026, seat belt use, occasional caffeine use (coffee), tobacco use none, no alcohol use and He had labs done on 05/15/2024, here to discuss the results today. He takes MVI daily. He will get RSV vaccine from his pharmacy, he is UTD on all vaccines. Patient also reports history of male hypogonadism/low testosterone level that is followed by Urology,asymptomatic. He also has a history of gout that is controlled with Uloric, he denies adverse Rx side effects. HTN and hypercholesterolemia are well controlled with current treatment regimen. He does see Cardiology (Dr. Riojas) as scheduled. Hypothyroidism is controlled with Rx, no side effects, asymptomatic. He does see Cardiology (Dr. Riojas) as scheduled. He does followup with Spine Ortho (Dr. Jiménez) as scheduled for chronic LBP, awaiting LESI, if no improvement, he would like referral for Neurosurgery consultation. He does followup with ENT (Dr. Heladio Rodriguez) as scheduled. He does followup with Dermatology (Dr. Arora) for skin care.   - He is obese with insulin resistance, he is not interested in surgical weight loss or nutrition referral, he is taking Zepbound 7.5 mg weekly with success, no side effects, he has lost 9 pounds since last visit, he is ready to proceed with increased dose of  Zepbound. He denies family history of medullary thyroid cancer or MEN II and personal history of pancreatitis.   - Patient is without any other complaints today.    denies Urinary leakage.  denies Recent falls or balance difficulty.   admits to Daily exercise or physical activity.  denies Depression, stress, anxiety, or emotional lability.   denies The need for healthcare treatment including a cane/walker, blood pressure monitoring, or regular vision/hearing tests.       Patient Care Team:  Cara Fiore MD as PCP - General (Family Medicine)  Tacos Fleming as Digital Medicine Health   Gina Castellanos PharmD as Hyperlipidemia Digital Medicine Clinician (Pharmacist)  Cara Fiore MD as Hyperlipidemia Digital Medicine Responsible Provider (Family Medicine)     Opioid Screening: Patient medication list reviewed, patient is not taking prescription opioids. Patient is not using additional opioids than prescribed. Patient is at low risk of substance abuse based on this opioid use history.      Advance Care Planning     Date: 05/21/2024    Power of   I initiated the process of voluntary advance care planning today and explained the importance of this process to the patient.  I introduced the concept of advance directives to the patient, as well. Then the patient received detailed information about the importance of designating a Health Care Power of  (HCPOA). He was also instructed to communicate with this person about their wishes for future healthcare, should he become sick and lose decision-making capacity. The patient has previously appointed a HCPOA. After our discussion, the patient has decided to complete a HCPOA and has appointed his daughter, health care agent:  Stephany Leal  & health care agent number:  [phone]  I spent a total time of 5 minutes discussing this issue with the patient.               -------------------------------------    Allergy    Hypercholesterolemia     Hypogonadism in male    Insomnia    Obesity, unspecified    Vitamin D deficiency        Past Surgical History:   Procedure Laterality Date    APPENDECTOMY      SINUS SURGERY         Review of patient's allergies indicates:   Allergen Reactions    Montelukast Anaphylaxis    Clindamycin Other (See Comments)     Other reaction(s): Cutaneous eruption, Weal    Hydrocodone bitartrate     Levofloxacin Other (See Comments)    Penicillins     Propoxyphene n-acetaminophen        Outpatient Medications Marked as Taking for the 5/21/24 encounter (Office Visit) with Cara Fiore MD   Medication Sig Dispense Refill    atorvastatin (LIPITOR) 10 MG tablet Take 10 mg by mouth every evening.      ciprofloxacin HCl (CIPRO) 500 MG tablet Take 500 mg by mouth 2 (two) times daily.      diclofenac (VOLTAREN) 75 MG EC tablet Take 75 mg by mouth 2 (two) times daily as needed.      febuxostat (ULORIC) 80 mg Tab Take 80 mg by mouth as needed.      furosemide (LASIX) 40 MG tablet Take 1 tablet (40 mg total) by mouth daily as needed. 90 tablet 3    guaiFENesin (MUCINEX) 600 mg 12 hr tablet 600 mg as needed.      hydroCHLOROthiazide (MICROZIDE) 12.5 mg capsule Take 12.5 mg by mouth.      hydrOXYzine HCL (ATARAX) 25 MG tablet TAKE 1 TABLET BY MOUTH AT BEDTIME AS NEEDED FOR INSOMNIA 90 tablet 3    levothyroxine (SYNTHROID) 25 MCG tablet TAKE 1/2 TABLET (12.5 MCG TOTAL) BY MOUTH EVERY DAY BEFORE BREAKFAST. 45 tablet 3    loratadine (CLARITIN) 10 mg tablet Take 10 mg by mouth daily as needed.      pantoprazole (PROTONIX) 40 MG tablet Take 40 mg by mouth once daily.      [DISCONTINUED] tirzepatide, weight loss, (ZEPBOUND) 7.5 mg/0.5 mL PnIj Inject 7.5 mg into the skin every 7 days. 4 Pen 1       Social History     Socioeconomic History    Marital status:    Tobacco Use    Smoking status: Never    Smokeless tobacco: Never   Substance and Sexual Activity    Alcohol use: Never    Drug use: Never    Sexual activity: Yes     Partners:  Female     Social Determinants of Health     Financial Resource Strain: Low Risk  (2024)    Overall Financial Resource Strain (CARDIA)     Difficulty of Paying Living Expenses: Not hard at all   Food Insecurity: No Food Insecurity (2024)    Hunger Vital Sign     Worried About Running Out of Food in the Last Year: Never true     Ran Out of Food in the Last Year: Never true   Transportation Needs: No Transportation Needs (2024)    PRAPARE - Transportation     Lack of Transportation (Medical): No     Lack of Transportation (Non-Medical): No   Physical Activity: Sufficiently Active (2024)    Exercise Vital Sign     Days of Exercise per Week: 5 days     Minutes of Exercise per Session: 30 min   Stress: No Stress Concern Present (2024)    [employer] - Occupational Stress Questionnaire     Feeling of Stress : Not at all   Housing Stability: Low Risk  (2024)    Housing Stability Vital Sign     Unable to Pay for Housing in the Last Year: No     Number of Places Lived in the Last Year: 1     Unstable Housing in the Last Year: No        Family History   Problem Relation Name Age of Onset    Hypertension Mother Nancy Leal             Stroke Father Rakesh Leal             Hyperlipidemia Brother Alexandra SPRINGPennie Elvis     Heart disease Brother Alexandra Leal     Hyperlipidemia Brother Justin MELCHORPennie Leal     Heart disease Brother Justin MELCHORPennie Elvis     Hyperlipidemia Brother      Hyperlipidemia Brother Enzo EDGAR Leal         Subjective:     ROS      See HPI for details    Constitutional: Denies Change in appetite. Denies Chills. Denies Fever. Denies Night sweats.  Eye: Denies Blurred vision. Denies Discharge. Denies Eye pain.  ENT: Denies Decreased hearing. Denies Sore throat. Denies Swollen glands.  Respiratory: Denies Cough. Denies Shortness of breath. Denies Shortness of breath with exertion. Denies Wheezing.  Cardiovascular: Denies Chest pain at rest. Denies Chest pain  "with exertion. Denies Irregular heartbeat. Denies Palpitations.  Gastrointestinal: Denies Abdominal pain. Denies Diarrhea. Denies Nausea. Denies Vomiting. Denies Hematemesis or Hematochezia.  Genitourinary: Denies Dysuria. Denies Urinary frequency. Denies Urinary urgency. Denies Blood in urine.  Endocrine: Denies Cold intolerance. Denies Excessive thirst. Denies Heat intolerance. Denies Weight loss. Denies Weight gain.  Musculoskeletal: Reports Painful joints- followed by Spine Ortho. Denies Weakness.  Integumentary: Denies Rash. Denies Itching. Denies Dry skin.  Neurologic: Denies Dizziness. Denies Fainting. Denies Headache.  Psychiatric: Denies Depression. Denies Anxiety. Denies Suicidal/Homicidal ideations.    All Other ROS: Negative except as stated in HPI.       Objective:     /86 (BP Location: Right arm, Patient Position: Sitting, BP Method: Large (Automatic))   Pulse 72   Resp 16   Ht 6' 1" (1.854 m)   Wt 113.4 kg (250 lb)   SpO2 98%   BMI 32.98 kg/m²     Physical Exam    General: Alert and oriented, No acute distress. Obese.   Head: Normocephalic, Atraumatic.  Eye: Pupils are equal, round and reactive to light, Extraocular movements are intact, Sclera non-icteric.  Ears/Nose/Throat: Normal, Mucosa moist,Clear.  Neck/Thyroid: Supple, Non-tender, No carotid bruit, No palpable thyromegaly or thyroid nodule, No lymphadenopathy, No JVD, Full range of motion.  Respiratory: Clear to auscultation bilaterally; No wheezes, rales or rhonchi,Non-labored respirations, Symmetrical chest wall expansion.  Cardiovascular: Regular rate and rhythm, S1/S2 normal, No murmurs, rubs or gallops.  Gastrointestinal: Soft, Non-tender, Non-distended, Normal bowel sounds, No palpable organomegaly.  Musculoskeletal: Normal range of motion.  Integumentary: Warm, Dry, Intact, No suspicious lesions or rashes.  Extremities: No clubbing, cyanosis or edema  Neurologic: No focal deficits, Cranial Nerves II-XII are grossly intact, " Motor strength normal upper and lower extremities, Sensory exam intact.  Psychiatric: Normal interaction, Coherent speech, Euthymic mood, Appropriate affect     *Lab results from 05/15/2024 were reviewed and discussed with patient and patient voices understanding.*      Assessment:       ICD-10-CM ICD-9-CM   1. Medicare annual wellness visit, subsequent  Z00.00 V70.0   2. Primary hypertension  I10 401.9   3. Essential familial hyperlipidemia  E78.49 272.2   4. Hypothyroidism, unspecified type  E03.9 244.9   5. Class 1 obesity due to excess calories with serious comorbidity and body mass index (BMI) of 32.0 to 32.9 in adult  E66.09 278.00    Z68.32 V85.32   6. Gout, unspecified cause, unspecified chronicity, unspecified site  M10.9 274.9        Plan:       Medicare Annual Wellness and Personalized Prevention Plan:     Fall Risk + Home Safety + Hearing Impairment + Depression Screen + Cognitive Impairment Screen + Health Risk Assessment all reviewed.          No data to display                  5/21/2024     8:52 AM 2/6/2024     8:53 AM 11/29/2023     8:56 AM 10/9/2023     1:21 PM 6/20/2023    11:24 AM 6/1/2023    11:25 AM 4/18/2023     1:15 PM   Depression Screeening PHQ2   Over the last two weeks how often have you been bothered by little interest or pleasure in doing things 0 0 0 0 0 0 0   Over the last two weeks how often have you been bothered by feeling down, depressed or hopeless 0 0 0 0 0 0 0   PHQ-2 Total Score 0 0 0 0 0 0 0         5/21/2024     9:00 AM 2/6/2024     8:45 AM 11/29/2023     9:00 AM 10/9/2023     1:30 PM 6/20/2023    11:00 AM 6/1/2023    11:15 AM 4/18/2023     1:15 PM   Fall Risk Assessment - Outpatient   Mobility Status Ambulatory Ambulatory Ambulatory Ambulatory Ambulatory Ambulatory Ambulatory   Number of falls 0 0 0 0 0 0 0   Identified as fall risk False False False False False False False             What [age] 65-69  Are you male or female?: Male  During the past four weeks, how  much have you been bothered by emotional problems such as feeling anxious, depressed, irritable, sad, or downhearted and blue?: Not at all  During the past five weeks, has your physical and/or emotional health limited your social activities with family, friends, neighbors, or groups?: Not at all  During the past four weeks, how much bodily pain have you generally had?: No pain  During the past four weeks, was someone available to help if you needed and wanted help?: Yes, as much as I wanted  During the past four weeks, what was the hardest physical activity you could do for at least two minutes?: Moderate  Can you get to places out of walking distance without help?  (For example, can you travel alone on buses or taxis, or drive your own car?): Yes  Can you go shopping for groceries or clothes without someone's help?: Yes  Can you prepare your own meals?: Yes  Can you do your own housework without help?: Yes  Because of any health problems, do you need the help of another person with your personal care needs such as eating, bathing, dressing, or getting around the house?: No  Can you handle your own money without help?: Yes  During the past four weeks, how would you rate your health in general?: Good  How have things been going for you during the past four weeks?: Pretty well  Are you having difficulties driving your car?: No  Do you always fasten your seat belt when you are in a car?: Yes, usually  How often in the past four weeks have you been bothered by falling or dizzy when standing up?: Never  How often in the past four weeks have you been bothered by sexual problems?: Never  How often in the past four weeks have you been bothered by trouble eating well?: Never  How often in the past four weeks have you been bothered by teeth or denture problems?: Never  How often in the past four weeks have you been bothered with problems using the telephone?: Never  How often in the past four weeks have you been bothered by  tiredness or fatigue?: Never  Have you fallen two or more times in the past year?: No  Are you afraid of falling?: Yes  Are you a smoker?: No  During the past four weeks, how many drinks of wine, beer, or other alcoholic beverages did you have?: No alcohol at all  Do you exercise for about 20 minutes three or more days a week?: Yes, some of the time  Have you been given any information to help you with hazards in your house that might hurt you?: Yes  Have you been given any information to help you with keeping track of your medications?: Yes  How often do you have trouble taking medicines the way you've been told to take them?: I always take them as prescribed  How confident are you that you can control and manage most of your health problems?: Very confident  What is your race? (Check all that apply.):                  Alcohol/Tobacco Use - Stressed importance of smoking cessation and limiting alcohol intake.  CVD Risk Factors - Reviewed  Obesity/Physical Activity - Normal BMI. Encouraged daily 30 minute physical activity x 5 days per week.      Health Maintenance Topics with due status: Not Due       Topic Last Completion Date    TETANUS VACCINE 09/19/2018    Colorectal Cancer Screening 02/08/2023    PROSTATE-SPECIFIC ANTIGEN 05/15/2024    Hemoglobin A1c (Diabetic Prevention Screening) 05/15/2024    Lipid Panel 05/15/2024        Vaccinations -   Immunization History   Administered Date(s) Administered    COVID-19, MRNA, LN-S, PF (Pfizer) (Gray Cap) 04/05/2022    COVID-19, MRNA, LN-S, PF (Pfizer) (Purple Cap) 02/26/2021, 03/19/2021, 09/29/2021    Influenza 11/15/2022    Influenza (FLUAD) - Quadrivalent - Adjuvanted - PF *Preferred* (65+) 10/09/2023    Influenza - Quadrivalent - High Dose - PF (65 years and older) 09/26/2021, 11/14/2022    Influenza - Quadrivalent - PF *Preferred* (6 months and older) 11/05/2014, 02/08/2018    Influenza - Trivalent - PF (ADULT) 11/05/2014, 10/17/2016, 02/08/2018,  09/19/2018, 11/19/2019, 09/23/2020, 09/26/2021    Pneumococcal Conjugate - 13 Valent 05/14/2020    Pneumococcal Polysaccharide - 23 Valent 07/23/2021    Tdap 09/19/2018    Zoster Recombinant 01/22/2021, 07/23/2021          1. Medicare annual wellness visit, subsequent  - Will treat pending lab results. Diet, exercise, and 10% weight loss encouraged. Keep appointment for dental exams x q6 months as scheduled. Keep appointment for annual eye exam as scheduled. Keep appointment with specialists as scheduled. Notify M.D. or ER if temp greater than 100.4, or any acute illness.      2. Primary hypertension  - CBC Auto Differential; Future in 11/2024.  - Comprehensive Metabolic Panel; Future in 11/2024.  - BP is well controlled. Continue BP Rx HCTZ as prescribed. Keep daily BP log. Continue followup with Cardiology as scheduled. Notify M.D. or ER if BP >170/100 or <90/60, chest pain, palpitations, headache, SOB, temp greater than 100.4, or any acute illness.   Continue  Low Sodium Diet (DASH Diet - Less than 2 grams of sodium per day).  Monitor blood pressure daily and log. Report consistent numbers greater than 140/90.  Smoking cessation encouraged to aid in BP reduction.  Maintain healthy weight with goal BMI <30. Exercise 30 minutes per day, 5 days per week.     3. Essential familial hyperlipidemia  - CK; Future in 11/2024.  - Comprehensive Metabolic Panel; Future in 11/2024.  - Lipid Panel; Future in 11/2024.  - Well controlled. Continue Rx Lipitor as prescribed.   Continue  Stressed importance of dietary modifications. Follow a low cholesterol, low saturated fat diet with less that 200mg of cholesterol a day.  Avoid fried foods and high saturated fats (high saturated fats less than 7% of calories).  Add Flax Seed/Fish Oil supplements to diet. Increase dietary fiber.  Regular exercise can reduce LDL and raise HDL. Stressed importance of physical activity 5 times per week for 30 minutes per day.    4. Hypothyroidism,  unspecified type  - TSH; Future in 11/2024.  - T4, Free; Future in 11/2024.  - Well controlled, Continue Synthroid as prescribed.     5. Class 1 obesity due to excess calories with serious comorbidity and body mass index (BMI) of 32.0 to 32.9 in adult  - tirzepatide, weight loss, (ZEPBOUND) 10 mg/0.5 mL PnIj; Inject 10 mg into the skin every 7 days.  Dispense: 2 mL; Refill: 1  - Diet, exercise, and 10% weight loss encouraged. Rx trial of increased dose of Zepbound to 10mg weekly with close monitoring to help with insulin resistance and obesity. Will titrate Rx Zepbound as needed/tolerated until max dose achieved. Notify M.D. or ER if symptoms persist or worsen, adverse Rx side effects, pancreatitis, temp greater than 100.4, or any acute illness.     Body mass index is 32.98 kg/m².  Goal BMI <30.  Exercise 5 times a week for 30 minutes per day.  Avoid soda, simple sugars, excessive rice, potatoes or bread. Limit fast foods and fried foods.  Choose complex carbs in moderation (example: green vegetables, beans, oatmeal). Eat plenty of fresh fruits and vegetables with lean meats daily.  Do not skip meals. Eat a balanced portion size.  Avoid fad diets. Consider permanent healthy life style changes.      6. Gout, unspecified cause, unspecified chronicity, unspecified site  - Uric Acid; Future in 11/2024.  - Asymptomatic. Continue Uloric as prescribed.  Gout dietary precautions encouraged. Notify M.D. or ER if symptoms persist or worsen, gout, temp >100.4, or any acute illness.       Medication List with Changes/Refills   New Medications    TIRZEPATIDE, WEIGHT LOSS, (ZEPBOUND) 10 MG/0.5 ML PNIJ    Inject 10 mg into the skin every 7 days.       Start Date: 5/21/2024 End Date: 7/20/2024   Current Medications    ATORVASTATIN (LIPITOR) 10 MG TABLET    Take 10 mg by mouth every evening.       Start Date: 4/23/2022 End Date: --    CIPROFLOXACIN HCL (CIPRO) 500 MG TABLET    Take 500 mg by mouth 2 (two) times daily.       Start  Date: 11/27/2022End Date: --    DICLOFENAC (VOLTAREN) 75 MG EC TABLET    Take 75 mg by mouth 2 (two) times daily as needed.       Start Date: 1/25/2022 End Date: --    FEBUXOSTAT (ULORIC) 80 MG TAB    Take 80 mg by mouth as needed.       Start Date: 2/18/2022 End Date: --    FUROSEMIDE (LASIX) 40 MG TABLET    Take 1 tablet (40 mg total) by mouth daily as needed.       Start Date: 7/31/2023 End Date: --    GABAPENTIN (NEURONTIN) 300 MG CAPSULE    Take 1 capsule (300 mg total) by mouth 3 (three) times daily.       Start Date: 6/1/2023  End Date: 11/28/2023    GUAIFENESIN (MUCINEX) 600 MG 12 HR TABLET    600 mg as needed.       Start Date: --        End Date: --    HYDROCHLOROTHIAZIDE (MICROZIDE) 12.5 MG CAPSULE    Take 12.5 mg by mouth.       Start Date: 9/27/2022 End Date: --    HYDROXYZINE HCL (ATARAX) 25 MG TABLET    TAKE 1 TABLET BY MOUTH AT BEDTIME AS NEEDED FOR INSOMNIA       Start Date: 9/25/2023 End Date: --    LEVOTHYROXINE (SYNTHROID) 25 MCG TABLET    TAKE 1/2 TABLET (12.5 MCG TOTAL) BY MOUTH EVERY DAY BEFORE BREAKFAST.       Start Date: 3/25/2024 End Date: --    LORATADINE (CLARITIN) 10 MG TABLET    Take 10 mg by mouth daily as needed.       Start Date: 10/4/2021 End Date: --    PANTOPRAZOLE (PROTONIX) 40 MG TABLET    Take 40 mg by mouth once daily.       Start Date: 6/5/2022  End Date: --   Discontinued Medications    TIRZEPATIDE, WEIGHT LOSS, (ZEPBOUND) 7.5 MG/0.5 ML PNIJ    Inject 7.5 mg into the skin every 7 days.       Start Date: 3/25/2024 End Date: 5/21/2024          The patient's Health Maintenance was reviewed and the following appears to be due at this time:   There are no preventive care reminders to display for this patient.        Follow up in about 4 weeks (around 6/18/2024) for Obesity Followup.

## 2024-06-05 ENCOUNTER — PATIENT MESSAGE (OUTPATIENT)
Dept: FAMILY MEDICINE | Facility: CLINIC | Age: 69
End: 2024-06-05
Payer: MEDICARE

## 2024-06-13 DIAGNOSIS — E66.09 CLASS 1 OBESITY DUE TO EXCESS CALORIES WITH SERIOUS COMORBIDITY AND BODY MASS INDEX (BMI) OF 32.0 TO 32.9 IN ADULT: ICD-10-CM

## 2024-06-13 DIAGNOSIS — M47.26 OSTEOARTHRITIS OF SPINE WITH RADICULOPATHY, LUMBAR REGION: ICD-10-CM

## 2024-06-13 RX ORDER — TIRZEPATIDE 10 MG/.5ML
10 INJECTION, SOLUTION SUBCUTANEOUS
Qty: 2 ML | Refills: 1 | Status: SHIPPED | OUTPATIENT
Start: 2024-06-13 | End: 2024-08-12

## 2024-06-14 RX ORDER — GABAPENTIN 300 MG/1
300 CAPSULE ORAL 3 TIMES DAILY
Qty: 90 CAPSULE | Refills: 5 | Status: SHIPPED | OUTPATIENT
Start: 2024-06-14

## 2024-07-13 ENCOUNTER — PATIENT MESSAGE (OUTPATIENT)
Dept: FAMILY MEDICINE | Facility: CLINIC | Age: 69
End: 2024-07-13
Payer: MEDICARE

## 2024-07-19 ENCOUNTER — TELEPHONE (OUTPATIENT)
Dept: FAMILY MEDICINE | Facility: CLINIC | Age: 69
End: 2024-07-19
Payer: MEDICARE

## 2024-07-25 ENCOUNTER — OFFICE VISIT (OUTPATIENT)
Dept: FAMILY MEDICINE | Facility: CLINIC | Age: 69
End: 2024-07-25
Payer: MEDICARE

## 2024-07-25 VITALS
DIASTOLIC BLOOD PRESSURE: 65 MMHG | OXYGEN SATURATION: 98 % | BODY MASS INDEX: 31.41 KG/M2 | HEIGHT: 73 IN | WEIGHT: 237 LBS | HEART RATE: 64 BPM | RESPIRATION RATE: 16 BRPM | SYSTOLIC BLOOD PRESSURE: 126 MMHG

## 2024-07-25 DIAGNOSIS — E66.09 CLASS 1 OBESITY DUE TO EXCESS CALORIES WITH SERIOUS COMORBIDITY AND BODY MASS INDEX (BMI) OF 31.0 TO 31.9 IN ADULT: Primary | ICD-10-CM

## 2024-07-25 PROCEDURE — 99213 OFFICE O/P EST LOW 20 MIN: CPT | Mod: ,,, | Performed by: FAMILY MEDICINE

## 2024-07-25 RX ORDER — TIRZEPATIDE 12.5 MG/.5ML
12.5 INJECTION, SOLUTION SUBCUTANEOUS
Qty: 2 ML | Refills: 1 | Status: SHIPPED | OUTPATIENT
Start: 2024-07-25 | End: 2024-09-23

## 2024-07-25 NOTE — PROGRESS NOTES
Patient ID: [id number]     Chief Complaint: Obesity        HPI:     Isidoro Lela is a 69 y.o. male here today for a follow up obesity.   - He is obese with insulin resistance, he is not interested in surgical weight loss or nutrition referral, he is taking Zepbound 10 mg weekly with success, no side effects, he has lost 13 pounds since last visit, he is ready to proceed with increased dose of Zepbound. He denies family history of medullary thyroid cancer or MEN II and personal history of pancreatitis.   - Patient is without any other complaints today.         -------------------------------------    Allergy    Hypercholesterolemia    Hypogonadism in male    Insomnia    Obesity, unspecified    Vitamin D deficiency        Past Surgical History:   Procedure Laterality Date    APPENDECTOMY      SINUS SURGERY         Review of patient's allergies indicates:   Allergen Reactions    Montelukast Anaphylaxis    Clindamycin Other (See Comments)     Other reaction(s): Cutaneous eruption, Weal    Hydrocodone bitartrate     Levofloxacin Other (See Comments)    Penicillins     Propoxyphene n-acetaminophen        Outpatient Medications Marked as Taking for the 7/25/24 encounter (Office Visit) with Cara Fiore MD   Medication Sig Dispense Refill    atorvastatin (LIPITOR) 10 MG tablet Take 10 mg by mouth every evening.      ciprofloxacin HCl (CIPRO) 500 MG tablet Take 500 mg by mouth 2 (two) times daily.      diclofenac (VOLTAREN) 75 MG EC tablet Take 75 mg by mouth 2 (two) times daily as needed.      febuxostat (ULORIC) 80 mg Tab Take 80 mg by mouth as needed.      furosemide (LASIX) 40 MG tablet Take 1 tablet (40 mg total) by mouth daily as needed. 90 tablet 3    gabapentin (NEURONTIN) 300 MG capsule TAKE 1 CAPSULE BY MOUTH THREE TIMES A DAY 90 capsule 5    guaiFENesin (MUCINEX) 600 mg 12 hr tablet 600 mg as needed.      hydroCHLOROthiazide (MICROZIDE) 12.5 mg capsule Take 12.5 mg by mouth.      hydrOXYzine HCL (ATARAX)  25 MG tablet TAKE 1 TABLET BY MOUTH AT BEDTIME AS NEEDED FOR INSOMNIA 90 tablet 3    levothyroxine (SYNTHROID) 25 MCG tablet TAKE 1/2 TABLET (12.5 MCG TOTAL) BY MOUTH EVERY DAY BEFORE BREAKFAST. 45 tablet 3    loratadine (CLARITIN) 10 mg tablet Take 10 mg by mouth daily as needed.      pantoprazole (PROTONIX) 40 MG tablet Take 40 mg by mouth once daily.      [DISCONTINUED] tirzepatide, weight loss, (ZEPBOUND) 10 mg/0.5 mL PnIj Inject 10 mg into the skin every 7 days. 2 mL 1       Social History     Socioeconomic History    Marital status:    Tobacco Use    Smoking status: Never    Smokeless tobacco: Never   Substance and Sexual Activity    Alcohol use: Never    Drug use: Never    Sexual activity: Yes     Partners: Female     Social Determinants of Health     Financial Resource Strain: Low Risk  (2024)    Overall Financial Resource Strain (CARDIA)     Difficulty of Paying Living Expenses: Not hard at all   Food Insecurity: No Food Insecurity (2024)    Hunger Vital Sign     Worried About Running Out of Food in the Last Year: Never true     Ran Out of Food in the Last Year: Never true   Transportation Needs: No Transportation Needs (2024)    PRAPARE - Transportation     Lack of Transportation (Medical): No     Lack of Transportation (Non-Medical): No   Physical Activity: Sufficiently Active (2024)    Exercise Vital Sign     Days of Exercise per Week: 5 days     Minutes of Exercise per Session: 30 min   Stress: No Stress Concern Present (2024)    [employer] - Occupational Stress Questionnaire     Feeling of Stress : Not at all   Housing Stability: Low Risk  (2024)    Housing Stability Vital Sign     Unable to Pay for Housing in the Last Year: No     Number of Places Lived in the Last Year: 1     Unstable Housing in the Last Year: No        Family History   Problem Relation Name Age of Onset    Hypertension Mother Nancy Leal             Stroke  "Father Rakesh Leal             Hyperlipidemia Brother Alexandra Leal     Heart disease Brother Alexandra Leal     Hyperlipidemia Brother Justin Leal     Heart disease Brother Justin Leal     Hyperlipidemia Brother      Hyperlipidemia Brother Enzo Leal         Subjective:       Review of Systems:    See HPI for details    Constitutional: Denies Change in appetite. Denies Chills. Denies Fever. Denies Night sweats.  Eye: Denies Blurred vision. Denies Discharge. Denies Eye pain.  ENT: Denies Decreased hearing. Denies Sore throat. Denies Swollen glands.  Respiratory: Denies Cough. Denies Shortness of breath. Denies Shortness of breath with exertion. Denies Wheezing.  Cardiovascular: Denies Chest pain at rest. Denies Chest pain with exertion. Denies Irregular heartbeat. Denies Palpitations.  Gastrointestinal: Denies Abdominal pain. Denies Diarrhea. Denies Nausea. Denies Vomiting. Denies Hematemesis or Hematochezia.  Genitourinary: Denies Dysuria. Denies Urinary frequency. Denies Urinary urgency. Denies Blood in urine.  Endocrine: Denies Cold intolerance. Denies Excessive thirst. Denies Heat intolerance. Denies Weight loss. Denies Weight gain.  Musculoskeletal: Denies Painful joints. Denies Weakness.  Integumentary: Denies Rash. Denies Itching. Denies Dry skin.  Neurologic: Denies Dizziness. Denies Fainting. Denies Headache.  Psychiatric: Denies Depression. Denies Anxiety. Denies Suicidal/Homicidal ideations.    All Other ROS: Negative except as stated in HPI.       Objective:     /65 (BP Location: Left arm, Patient Position: Sitting, BP Method: Large (Automatic))   Pulse 64   Resp 16   Ht 6' 1" (1.854 m)   Wt 107.5 kg (237 lb)   SpO2 98%   BMI 31.27 kg/m²     Physical Exam    General: Alert and oriented, No acute distress. Obese.   Head: Normocephalic, Atraumatic.  Eye: Pupils are equal, round and reactive to light, Extraocular movements are intact, Sclera non-icteric.  Ears/Nose/Throat: Normal, " Mucosa moist,Clear.  Neck/Thyroid: Supple, Non-tender, No carotid bruit, No palpable thyromegaly or thyroid nodule, No lymphadenopathy, No JVD, Full range of motion.  Respiratory: Clear to auscultation bilaterally; No wheezes, rales or rhonchi,Non-labored respirations, Symmetrical chest wall expansion.  Cardiovascular: Regular rate and rhythm, S1/S2 normal, No murmurs, rubs or gallops.  Gastrointestinal: Soft, Non-tender, Non-distended, Normal bowel sounds, No palpable organomegaly.  Musculoskeletal: Normal range of motion.  Integumentary: Warm, Dry, Intact, No suspicious lesions or rashes.  Extremities: No clubbing, cyanosis or edema  Neurologic: No focal deficits, Cranial Nerves II-XII are grossly intact, Motor strength normal upper and lower extremities, Sensory exam intact.  Psychiatric: Normal interaction, Coherent speech, Euthymic mood, Appropriate affect.         Assessment:       ICD-10-CM ICD-9-CM   1. Class 1 obesity due to excess calories with serious comorbidity and body mass index (BMI) of 31.0 to 31.9 in adult  E66.09 278.00    Z68.31 V85.31        Plan:     Problem List Items Addressed This Visit    None  Visit Diagnoses       Class 1 obesity due to excess calories with serious comorbidity and body mass index (BMI) of 31.0 to 31.9 in adult    -  Primary    Relevant Medications    tirzepatide, weight loss, (ZEPBOUND) 12.5 mg/0.5 mL PnIj         1. Class 1 obesity due to excess calories with serious comorbidity and body mass index (BMI) of 31.0 to 31.9 in adult  - tirzepatide, weight loss, (ZEPBOUND) 12.5 mg/0.5 mL PnIj; Inject 12.5 mg into the skin every 7 days.  Dispense: 2 mL; Refill: 1  - Diet, exercise, and 10% weight loss encouraged. Rx trial of increased dose of Zepbound to 12.5 mg weekly with close monitoring to help with insulin resistance and obesity. Will titrate Rx Zepbound as needed/tolerated until max dose achieved. Notify M.D. or ER if symptoms persist or worsen, adverse Rx side effects,  pancreatitis, temp greater than 100.4, or any acute illness.     Body mass index is 31.27 kg/m².  Goal BMI <30.  Exercise 5 times a week for 30 minutes per day.  Avoid soda, simple sugars, excessive rice, potatoes or bread. Limit fast foods and fried foods.  Choose complex carbs in moderation (example: green vegetables, beans, oatmeal). Eat plenty of fresh fruits and vegetables with lean meats daily.  Do not skip meals. Eat a balanced portion size.  Avoid fad diets. Consider permanent healthy life style changes.        Isidoro was seen today for obesity.    Diagnoses and all orders for this visit:    Class 1 obesity due to excess calories with serious comorbidity and body mass index (BMI) of 31.0 to 31.9 in adult  -     tirzepatide, weight loss, (ZEPBOUND) 12.5 mg/0.5 mL PnIj; Inject 12.5 mg into the skin every 7 days.          Medication List with Changes/Refills   New Medications    TIRZEPATIDE, WEIGHT LOSS, (ZEPBOUND) 12.5 MG/0.5 ML PNIJ    Inject 12.5 mg into the skin every 7 days.       Start Date: 7/25/2024 End Date: 9/23/2024   Current Medications    ATORVASTATIN (LIPITOR) 10 MG TABLET    Take 10 mg by mouth every evening.       Start Date: 4/23/2022 End Date: --    CIPROFLOXACIN HCL (CIPRO) 500 MG TABLET    Take 500 mg by mouth 2 (two) times daily.       Start Date: 11/27/2022End Date: --    DICLOFENAC (VOLTAREN) 75 MG EC TABLET    Take 75 mg by mouth 2 (two) times daily as needed.       Start Date: 1/25/2022 End Date: --    FEBUXOSTAT (ULORIC) 80 MG TAB    Take 80 mg by mouth as needed.       Start Date: 2/18/2022 End Date: --    FUROSEMIDE (LASIX) 40 MG TABLET    Take 1 tablet (40 mg total) by mouth daily as needed.       Start Date: 7/31/2023 End Date: --    GABAPENTIN (NEURONTIN) 300 MG CAPSULE    TAKE 1 CAPSULE BY MOUTH THREE TIMES A DAY       Start Date: 6/14/2024 End Date: --    GUAIFENESIN (MUCINEX) 600 MG 12 HR TABLET    600 mg as needed.       Start Date: --        End Date: --     HYDROCHLOROTHIAZIDE (MICROZIDE) 12.5 MG CAPSULE    Take 12.5 mg by mouth.       Start Date: 9/27/2022 End Date: --    HYDROXYZINE HCL (ATARAX) 25 MG TABLET    TAKE 1 TABLET BY MOUTH AT BEDTIME AS NEEDED FOR INSOMNIA       Start Date: 9/25/2023 End Date: --    LEVOTHYROXINE (SYNTHROID) 25 MCG TABLET    TAKE 1/2 TABLET (12.5 MCG TOTAL) BY MOUTH EVERY DAY BEFORE BREAKFAST.       Start Date: 3/25/2024 End Date: --    LORATADINE (CLARITIN) 10 MG TABLET    Take 10 mg by mouth daily as needed.       Start Date: 10/4/2021 End Date: --    PANTOPRAZOLE (PROTONIX) 40 MG TABLET    Take 40 mg by mouth once daily.       Start Date: 6/5/2022  End Date: --   Discontinued Medications    TIRZEPATIDE, WEIGHT LOSS, (ZEPBOUND) 10 MG/0.5 ML PNIJ    Inject 10 mg into the skin every 7 days.       Start Date: 6/13/2024 End Date: 7/25/2024          Follow up in about 4 weeks (around 8/22/2024) for Obesity Followup.

## 2024-08-07 ENCOUNTER — PATIENT MESSAGE (OUTPATIENT)
Dept: FAMILY MEDICINE | Facility: CLINIC | Age: 69
End: 2024-08-07
Payer: MEDICARE

## 2024-08-07 DIAGNOSIS — E66.09 CLASS 1 OBESITY DUE TO EXCESS CALORIES WITH SERIOUS COMORBIDITY AND BODY MASS INDEX (BMI) OF 31.0 TO 31.9 IN ADULT: ICD-10-CM

## 2024-08-07 RX ORDER — TIRZEPATIDE 12.5 MG/.5ML
12.5 INJECTION, SOLUTION SUBCUTANEOUS
Qty: 2 ML | Refills: 1 | Status: SHIPPED | OUTPATIENT
Start: 2024-08-07 | End: 2024-08-08

## 2024-08-08 DIAGNOSIS — E66.09 CLASS 1 OBESITY DUE TO EXCESS CALORIES WITH SERIOUS COMORBIDITY AND BODY MASS INDEX (BMI) OF 31.0 TO 31.9 IN ADULT: ICD-10-CM

## 2024-08-08 RX ORDER — TIRZEPATIDE 10 MG/.5ML
10 INJECTION, SOLUTION SUBCUTANEOUS
Qty: 2 ML | Refills: 1 | Status: SHIPPED | OUTPATIENT
Start: 2024-08-08 | End: 2024-10-07

## 2024-08-23 DIAGNOSIS — M47.26 OSTEOARTHRITIS OF SPINE WITH RADICULOPATHY, LUMBAR REGION: ICD-10-CM

## 2024-08-26 RX ORDER — FUROSEMIDE 40 MG/1
40 TABLET ORAL DAILY PRN
Qty: 90 TABLET | Refills: 3 | Status: SHIPPED | OUTPATIENT
Start: 2024-08-26

## 2024-08-26 RX ORDER — GABAPENTIN 300 MG/1
300 CAPSULE ORAL 3 TIMES DAILY
Qty: 90 CAPSULE | Refills: 5 | Status: SHIPPED | OUTPATIENT
Start: 2024-08-26

## 2024-09-05 ENCOUNTER — PATIENT MESSAGE (OUTPATIENT)
Dept: FAMILY MEDICINE | Facility: CLINIC | Age: 69
End: 2024-09-05
Payer: MEDICARE

## 2024-09-05 DIAGNOSIS — E66.09 CLASS 1 OBESITY DUE TO EXCESS CALORIES WITH SERIOUS COMORBIDITY AND BODY MASS INDEX (BMI) OF 31.0 TO 31.9 IN ADULT: ICD-10-CM

## 2024-09-06 RX ORDER — TIRZEPATIDE 10 MG/.5ML
10 INJECTION, SOLUTION SUBCUTANEOUS
Qty: 2 ML | Refills: 1 | Status: SHIPPED | OUTPATIENT
Start: 2024-09-06 | End: 2024-11-05

## 2024-09-19 ENCOUNTER — OFFICE VISIT (OUTPATIENT)
Dept: FAMILY MEDICINE | Facility: CLINIC | Age: 69
End: 2024-09-19
Payer: MEDICARE

## 2024-09-19 VITALS
HEIGHT: 73 IN | SYSTOLIC BLOOD PRESSURE: 111 MMHG | HEART RATE: 76 BPM | DIASTOLIC BLOOD PRESSURE: 67 MMHG | OXYGEN SATURATION: 97 % | BODY MASS INDEX: 30.75 KG/M2 | WEIGHT: 232 LBS | RESPIRATION RATE: 16 BRPM

## 2024-09-19 DIAGNOSIS — E66.09 CLASS 1 OBESITY DUE TO EXCESS CALORIES WITH SERIOUS COMORBIDITY AND BODY MASS INDEX (BMI) OF 30.0 TO 30.9 IN ADULT: Primary | ICD-10-CM

## 2024-09-19 DIAGNOSIS — Z23 FLU VACCINE NEED: ICD-10-CM

## 2024-09-19 PROCEDURE — G0008 ADMIN INFLUENZA VIRUS VAC: HCPCS | Mod: ,,, | Performed by: FAMILY MEDICINE

## 2024-09-19 PROCEDURE — 99213 OFFICE O/P EST LOW 20 MIN: CPT | Mod: ,,, | Performed by: FAMILY MEDICINE

## 2024-09-19 PROCEDURE — 90653 IIV ADJUVANT VACCINE IM: CPT | Mod: ,,, | Performed by: FAMILY MEDICINE

## 2024-09-19 RX ORDER — TIRZEPATIDE 12.5 MG/.5ML
12.5 INJECTION, SOLUTION SUBCUTANEOUS
Qty: 2 ML | Refills: 1 | Status: SHIPPED | OUTPATIENT
Start: 2024-09-19 | End: 2024-11-18

## 2024-09-19 NOTE — PROGRESS NOTES
Patient ID: [id number]     Chief Complaint: Obesity        HPI:     Isidoro Leal is a 69 y.o. male here today for a follow up obesity.  - He is obese with insulin resistance, he is not interested in surgical weight loss or nutrition referral, he is taking Zepbound 10 mg weekly with success, no side effects, he has lost 5 pounds since last visit, he is ready to proceed with increased dose of Zepbound. He denies family history of medullary thyroid cancer or MEN II and personal history of pancreatitis.   - He would like flu vaccine today.   - Patient is without any other complaints today.          -------------------------------------    Allergy    Hypercholesterolemia    Hypogonadism in male    Insomnia    Obesity, unspecified    Vitamin D deficiency        Past Surgical History:   Procedure Laterality Date    APPENDECTOMY      SINUS SURGERY         Review of patient's allergies indicates:   Allergen Reactions    Montelukast Anaphylaxis    Clindamycin Other (See Comments)     Other reaction(s): Cutaneous eruption, Weal    Hydrocodone bitartrate     Levofloxacin Other (See Comments)    Penicillins     Propoxyphene n-acetaminophen        Outpatient Medications Marked as Taking for the 9/19/24 encounter (Office Visit) with Cara Fiore MD   Medication Sig Dispense Refill    atorvastatin (LIPITOR) 10 MG tablet Take 10 mg by mouth every evening.      ciprofloxacin HCl (CIPRO) 500 MG tablet Take 500 mg by mouth 2 (two) times daily.      diclofenac (VOLTAREN) 75 MG EC tablet Take 75 mg by mouth 2 (two) times daily as needed.      febuxostat (ULORIC) 80 mg Tab Take 80 mg by mouth as needed.      furosemide (LASIX) 40 MG tablet TAKE 1 TABLET BY MOUTH DAILY AS NEEDED 90 tablet 3    furosemide (LASIX) 40 MG tablet Take 1 tablet (40 mg total) by mouth daily as needed. 90 tablet 3    gabapentin (NEURONTIN) 300 MG capsule Take 1 capsule (300 mg total) by mouth 3 (three) times daily. 90 capsule 5    guaiFENesin (MUCINEX)  600 mg 12 hr tablet 600 mg as needed.      hydroCHLOROthiazide (MICROZIDE) 12.5 mg capsule Take 12.5 mg by mouth.      hydrOXYzine HCL (ATARAX) 25 MG tablet TAKE 1 TABLET BY MOUTH AT BEDTIME AS NEEDED FOR INSOMNIA 90 tablet 3    levothyroxine (SYNTHROID) 25 MCG tablet TAKE 1/2 TABLET (12.5 MCG TOTAL) BY MOUTH EVERY DAY BEFORE BREAKFAST. 45 tablet 3    loratadine (CLARITIN) 10 mg tablet Take 10 mg by mouth daily as needed.      pantoprazole (PROTONIX) 40 MG tablet Take 40 mg by mouth once daily.      [DISCONTINUED] tirzepatide, weight loss, (ZEPBOUND) 10 mg/0.5 mL PnIj Inject 10 mg into the skin every 7 days. 2 mL 1     Current Facility-Administered Medications for the 9/19/24 encounter (Office Visit) with Cara Fiore MD   Medication Dose Route Frequency Provider Last Rate Last Admin    influenza (adjuvanted) (Fluad) 45 mcg/0.5 mL IM vaccine (> or = 64 yo) 0.5 mL  0.5 mL Intramuscular 1 time in Clinic/HOD            Social History     Socioeconomic History    Marital status:    Tobacco Use    Smoking status: Never    Smokeless tobacco: Never   Substance and Sexual Activity    Alcohol use: Never    Drug use: Never    Sexual activity: Yes     Partners: Female     Social Determinants of Health     Financial Resource Strain: Low Risk  (1/31/2024)    Overall Financial Resource Strain (CARDIA)     Difficulty of Paying Living Expenses: Not hard at all   Food Insecurity: No Food Insecurity (1/31/2024)    Hunger Vital Sign     Worried About Running Out of Food in the Last Year: Never true     Ran Out of Food in the Last Year: Never true   Transportation Needs: No Transportation Needs (1/31/2024)    PRAPARE - Transportation     Lack of Transportation (Medical): No     Lack of Transportation (Non-Medical): No   Physical Activity: Sufficiently Active (5/21/2024)    Exercise Vital Sign     Days of Exercise per Week: 5 days     Minutes of Exercise per Session: 30 min   Stress: No Stress Concern Present  (2024)    [employer] - Occupational Stress Questionnaire     Feeling of Stress : Not at all   Housing Stability: Low Risk  (2024)    Housing Stability Vital Sign     Unable to Pay for Housing in the Last Year: No     Number of Places Lived in the Last Year: 1     Unstable Housing in the Last Year: No        Family History   Problem Relation Name Age of Onset    Hypertension Mother Nancy Leal             Stroke Father Rakesh Leal             Hyperlipidemia Brother Alexandra Leal     Heart disease Brother Alexandra Leal     Hyperlipidemia Brother Justin Leal     Heart disease Brother Justin Leal     Hyperlipidemia Brother      Hyperlipidemia Brother Enzo Leal         Subjective:       Review of Systems:    See HPI for details    Constitutional: Denies Change in appetite. Denies Chills. Denies Fever. Denies Night sweats.  Eye: Denies Blurred vision. Denies Discharge. Denies Eye pain.  ENT: Denies Decreased hearing. Denies Sore throat. Denies Swollen glands.  Respiratory: Denies Cough. Denies Shortness of breath. Denies Shortness of breath with exertion. Denies Wheezing.  Cardiovascular: Denies Chest pain at rest. Denies Chest pain with exertion. Denies Irregular heartbeat. Denies Palpitations.  Gastrointestinal: Denies Abdominal pain. Denies Diarrhea. Denies Nausea. Denies Vomiting. Denies Hematemesis or Hematochezia.  Genitourinary: Denies Dysuria. Denies Urinary frequency. Denies Urinary urgency. Denies Blood in urine.  Endocrine: Denies Cold intolerance. Denies Excessive thirst. Denies Heat intolerance. Denies Weight loss. Denies Weight gain.  Musculoskeletal: Denies Painful joints. Denies Weakness.  Integumentary: Denies Rash. Denies Itching. Denies Dry skin.  Neurologic: Denies Dizziness. Denies Fainting. Denies Headache.  Psychiatric: Denies Depression. Denies Anxiety. Denies Suicidal/Homicidal ideations.    All Other ROS: Negative except as stated  "in HPI.       Objective:     /67 (BP Location: Right arm, Patient Position: Sitting, BP Method: Large (Automatic))   Pulse 76   Resp 16   Ht 6' 1" (1.854 m)   Wt 105.2 kg (232 lb)   SpO2 97%   BMI 30.61 kg/m²     Physical Exam    General: Alert and oriented, No acute distress. Obese.   Head: Normocephalic, Atraumatic.  Eye: Pupils are equal, round and reactive to light, Extraocular movements are intact, Sclera non-icteric.  Ears/Nose/Throat: Normal, Mucosa moist,Clear.  Neck/Thyroid: Supple, Non-tender, No carotid bruit, No palpable thyromegaly or thyroid nodule, No lymphadenopathy, No JVD, Full range of motion.  Respiratory: Clear to auscultation bilaterally; No wheezes, rales or rhonchi,Non-labored respirations, Symmetrical chest wall expansion.  Cardiovascular: Regular rate and rhythm, S1/S2 normal, No murmurs, rubs or gallops.  Gastrointestinal: Soft, Non-tender, Non-distended, Normal bowel sounds, No palpable organomegaly.  Musculoskeletal: Normal range of motion.  Integumentary: Warm, Dry, Intact, No suspicious lesions or rashes.  Extremities: No clubbing, cyanosis or edema  Neurologic: No focal deficits, Cranial Nerves II-XII are grossly intact, Motor strength normal upper and lower extremities, Sensory exam intact.  Psychiatric: Normal interaction, Coherent speech, Euthymic mood, Appropriate affect         Assessment:       ICD-10-CM ICD-9-CM   1. Class 1 obesity due to excess calories with serious comorbidity and body mass index (BMI) of 30.0 to 30.9 in adult  E66.09 278.00    Z68.30 V85.30   2. Flu vaccine need  Z23 V04.81        Plan:     Problem List Items Addressed This Visit    None  Visit Diagnoses       Class 1 obesity due to excess calories with serious comorbidity and body mass index (BMI) of 30.0 to 30.9 in adult    -  Primary    Relevant Medications    tirzepatide, weight loss, (ZEPBOUND) 12.5 mg/0.5 mL PnIj    Flu vaccine need        Relevant Medications    influenza (adjuvanted) " (Fluad) 45 mcg/0.5 mL IM vaccine (> or = 66 yo) 0.5 mL (Start on 9/19/2024  3:45 PM)         1. Class 1 obesity due to excess calories with serious comorbidity and body mass index (BMI) of 30.0 to 30.9 in adult  - tirzepatide, weight loss, (ZEPBOUND) 12.5 mg/0.5 mL PnIj; Inject 12.5 mg into the skin every 7 days.  Dispense: 2 mL; Refill: 1  - Diet, exercise, and 10% weight loss encouraged. Rx trial of increased dose of Zepbound to 12.5 mg weekly with close monitoring to help with insulin resistance and obesity. Will titrate Rx Zepbound as needed/tolerated until max dose achieved. Notify M.D. or ER if symptoms persist or worsen, adverse Rx side effects, pancreatitis, temp greater than 100.4, or any acute illness.     Body mass index is 30.61 kg/m².  Goal BMI <30.  Exercise 5 times a week for 30 minutes per day.  Avoid soda, simple sugars, excessive rice, potatoes or bread. Limit fast foods and fried foods.  Choose complex carbs in moderation (example: green vegetables, beans, oatmeal). Eat plenty of fresh fruits and vegetables with lean meats daily.  Do not skip meals. Eat a balanced portion size.  Avoid fad diets. Consider permanent healthy life style changes.      2. Flu vaccine need  - influenza (adjuvanted) (Fluad) 45 mcg/0.5 mL IM vaccine (> or = 66 yo) 0.5 mL IM x 1 today         Isidoro was seen today for obesity.    Diagnoses and all orders for this visit:    Class 1 obesity due to excess calories with serious comorbidity and body mass index (BMI) of 30.0 to 30.9 in adult  -     tirzepatide, weight loss, (ZEPBOUND) 12.5 mg/0.5 mL PnIj; Inject 12.5 mg into the skin every 7 days.    Flu vaccine need  -     influenza (adjuvanted) (Fluad) 45 mcg/0.5 mL IM vaccine (> or = 66 yo) 0.5 mL          Medication List with Changes/Refills   New Medications    TIRZEPATIDE, WEIGHT LOSS, (ZEPBOUND) 12.5 MG/0.5 ML PNIJ    Inject 12.5 mg into the skin every 7 days.       Start Date: 9/19/2024 End Date: 11/18/2024   Current  Medications    ATORVASTATIN (LIPITOR) 10 MG TABLET    Take 10 mg by mouth every evening.       Start Date: 4/23/2022 End Date: --    CIPROFLOXACIN HCL (CIPRO) 500 MG TABLET    Take 500 mg by mouth 2 (two) times daily.       Start Date: 11/27/2022End Date: --    DICLOFENAC (VOLTAREN) 75 MG EC TABLET    Take 75 mg by mouth 2 (two) times daily as needed.       Start Date: 1/25/2022 End Date: --    FEBUXOSTAT (ULORIC) 80 MG TAB    Take 80 mg by mouth as needed.       Start Date: 2/18/2022 End Date: --    FUROSEMIDE (LASIX) 40 MG TABLET    TAKE 1 TABLET BY MOUTH DAILY AS NEEDED       Start Date: 8/26/2024 End Date: --    FUROSEMIDE (LASIX) 40 MG TABLET    Take 1 tablet (40 mg total) by mouth daily as needed.       Start Date: 8/26/2024 End Date: --    GABAPENTIN (NEURONTIN) 300 MG CAPSULE    Take 1 capsule (300 mg total) by mouth 3 (three) times daily.       Start Date: 8/26/2024 End Date: --    GUAIFENESIN (MUCINEX) 600 MG 12 HR TABLET    600 mg as needed.       Start Date: --        End Date: --    HYDROCHLOROTHIAZIDE (MICROZIDE) 12.5 MG CAPSULE    Take 12.5 mg by mouth.       Start Date: 9/27/2022 End Date: --    HYDROXYZINE HCL (ATARAX) 25 MG TABLET    TAKE 1 TABLET BY MOUTH AT BEDTIME AS NEEDED FOR INSOMNIA       Start Date: 9/25/2023 End Date: --    LEVOTHYROXINE (SYNTHROID) 25 MCG TABLET    TAKE 1/2 TABLET (12.5 MCG TOTAL) BY MOUTH EVERY DAY BEFORE BREAKFAST.       Start Date: 3/25/2024 End Date: --    LORATADINE (CLARITIN) 10 MG TABLET    Take 10 mg by mouth daily as needed.       Start Date: 10/4/2021 End Date: --    PANTOPRAZOLE (PROTONIX) 40 MG TABLET    Take 40 mg by mouth once daily.       Start Date: 6/5/2022  End Date: --   Discontinued Medications    TIRZEPATIDE, WEIGHT LOSS, (ZEPBOUND) 10 MG/0.5 ML PNIJ    Inject 10 mg into the skin every 7 days.       Start Date: 9/6/2024  End Date: 9/19/2024          Follow up in about 4 weeks (around 10/17/2024) for Obesity Followup.

## 2024-10-16 ENCOUNTER — OFFICE VISIT (OUTPATIENT)
Dept: FAMILY MEDICINE | Facility: CLINIC | Age: 69
End: 2024-10-16
Payer: MEDICARE

## 2024-10-16 VITALS
OXYGEN SATURATION: 99 % | BODY MASS INDEX: 30.62 KG/M2 | HEART RATE: 73 BPM | RESPIRATION RATE: 16 BRPM | SYSTOLIC BLOOD PRESSURE: 133 MMHG | DIASTOLIC BLOOD PRESSURE: 84 MMHG | WEIGHT: 231 LBS | HEIGHT: 73 IN

## 2024-10-16 DIAGNOSIS — E66.811 CLASS 1 OBESITY DUE TO EXCESS CALORIES WITH SERIOUS COMORBIDITY AND BODY MASS INDEX (BMI) OF 30.0 TO 30.9 IN ADULT: Primary | ICD-10-CM

## 2024-10-16 DIAGNOSIS — E66.09 CLASS 1 OBESITY DUE TO EXCESS CALORIES WITH SERIOUS COMORBIDITY AND BODY MASS INDEX (BMI) OF 30.0 TO 30.9 IN ADULT: Primary | ICD-10-CM

## 2024-10-16 DIAGNOSIS — J01.90 SUBACUTE SINUSITIS, UNSPECIFIED LOCATION: ICD-10-CM

## 2024-10-16 PROCEDURE — 99214 OFFICE O/P EST MOD 30 MIN: CPT | Mod: ,,, | Performed by: FAMILY MEDICINE

## 2024-10-16 RX ORDER — AZITHROMYCIN 250 MG/1
TABLET, FILM COATED ORAL
Qty: 6 TABLET | Refills: 0 | Status: SHIPPED | OUTPATIENT
Start: 2024-10-16 | End: 2024-10-21

## 2024-10-16 RX ORDER — FLUTICASONE PROPIONATE 50 MCG
2 SPRAY, SUSPENSION (ML) NASAL DAILY
Qty: 16 G | Refills: 5 | Status: SHIPPED | OUTPATIENT
Start: 2024-10-16 | End: 2025-10-16

## 2024-10-16 NOTE — PROGRESS NOTES
Patient ID: [id number]     Chief Complaint: Obesity        HPI:     Isidoro Leal is a 69 y.o. male here today for a follow up obesity.  - He is obese with insulin resistance, he is not interested in surgical weight loss or nutrition referral, he is taking Zepbound 12.5 mg weekly with success, no side effects, he has lost 1 pound since last visit, he would like to keep the Rx at this dose. He denies family history of medullary thyroid cancer or MEN II and personal history of pancreatitis.   - Patient complains of sinus pressure, post nasal drip, clear rhinorrhea, shotty cervical LAD x 1 month. He denies fever, chills, cough, SOB, wheezing, hemoptysis. He has tried Nasal saline, Claritin and Allegra OTC without resolution of symptoms. He reports that a Z-gricelda usually helps his symptoms.  - He is scheduled for [facility] due to chronic LBP with LOS (Dr. Dove) next week.   - Patient is without any other complaints today.           -------------------------------------    Allergy    Hypercholesterolemia    Hypogonadism in male    Insomnia    Obesity, unspecified    Vitamin D deficiency        Past Surgical History:   Procedure Laterality Date    APPENDECTOMY      SINUS SURGERY         Review of patient's allergies indicates:   Allergen Reactions    Montelukast Anaphylaxis    Clindamycin Other (See Comments)     Other reaction(s): Cutaneous eruption, Weal    Hydrocodone bitartrate     Levofloxacin Other (See Comments)    Penicillins     Propoxyphene n-acetaminophen        Outpatient Medications Marked as Taking for the 10/16/24 encounter (Office Visit) with Cara Fiore MD   Medication Sig Dispense Refill    atorvastatin (LIPITOR) 10 MG tablet Take 10 mg by mouth every evening.      ciprofloxacin HCl (CIPRO) 500 MG tablet Take 500 mg by mouth 2 (two) times daily.      diclofenac (VOLTAREN) 75 MG EC tablet Take 75 mg by mouth 2 (two) times daily as needed.      febuxostat (ULORIC) 80 mg Tab Take 80 mg by mouth as  needed.      furosemide (LASIX) 40 MG tablet TAKE 1 TABLET BY MOUTH DAILY AS NEEDED 90 tablet 3    gabapentin (NEURONTIN) 300 MG capsule Take 1 capsule (300 mg total) by mouth 3 (three) times daily. 90 capsule 5    guaiFENesin (MUCINEX) 600 mg 12 hr tablet 600 mg as needed.      hydroCHLOROthiazide (MICROZIDE) 12.5 mg capsule Take 12.5 mg by mouth.      hydrOXYzine HCL (ATARAX) 25 MG tablet TAKE 1 TABLET BY MOUTH AT BEDTIME AS NEEDED FOR INSOMNIA 90 tablet 3    levothyroxine (SYNTHROID) 25 MCG tablet TAKE 1/2 TABLET (12.5 MCG TOTAL) BY MOUTH EVERY DAY BEFORE BREAKFAST. 45 tablet 3    loratadine (CLARITIN) 10 mg tablet Take 10 mg by mouth daily as needed.      pantoprazole (PROTONIX) 40 MG tablet Take 40 mg by mouth once daily.      tirzepatide, weight loss, (ZEPBOUND) 12.5 mg/0.5 mL PnIj Inject 12.5 mg into the skin every 7 days. 2 mL 1       Social History     Socioeconomic History    Marital status:    Tobacco Use    Smoking status: Never    Smokeless tobacco: Never   Substance and Sexual Activity    Alcohol use: Never    Drug use: Never    Sexual activity: Yes     Partners: Female     Social Drivers of Health     Financial Resource Strain: Low Risk  (1/31/2024)    Overall Financial Resource Strain (CARDIA)     Difficulty of Paying Living Expenses: Not hard at all   Food Insecurity: No Food Insecurity (1/31/2024)    Hunger Vital Sign     Worried About Running Out of Food in the Last Year: Never true     Ran Out of Food in the Last Year: Never true   Transportation Needs: No Transportation Needs (1/31/2024)    PRAPARE - Transportation     Lack of Transportation (Medical): No     Lack of Transportation (Non-Medical): No   Physical Activity: Sufficiently Active (5/21/2024)    Exercise Vital Sign     Days of Exercise per Week: 5 days     Minutes of Exercise per Session: 30 min   Stress: No Stress Concern Present (1/31/2024)    [employer] - Occupational Stress Questionnaire      Feeling of Stress : Not at all   Housing Stability: Low Risk  (2024)    Housing Stability Vital Sign     Unable to Pay for Housing in the Last Year: No     Number of Places Lived in the Last Year: 1     Unstable Housing in the Last Year: No        Family History   Problem Relation Name Age of Onset    Hypertension Mother Nancy Leal             Stroke Father Rakesh Leal             Hyperlipidemia Brother Alexandra Leal     Heart disease Brother Alexandra Leal     Hyperlipidemia Brother Justin Leal     Heart disease Brother Justin Leal     Hyperlipidemia Brother      Hyperlipidemia Brother Enzo Leal         Subjective:       Review of Systems:    See HPI for details    Constitutional: Denies Change in appetite. Denies Chills. Denies Fever. Denies Night sweats.  Eye: Denies Blurred vision. Denies Discharge. Denies Eye pain.  ENT: As per HPI. Denies Decreased hearing. Denies Sore throat. Reports Swollen glands.  Respiratory: Denies Cough. Denies Shortness of breath. Denies Shortness of breath with exertion. Denies Wheezing.  Cardiovascular: Denies Chest pain at rest. Denies Chest pain with exertion. Denies Irregular heartbeat. Denies Palpitations.  Gastrointestinal: Denies Abdominal pain. Denies Diarrhea. Denies Nausea. Denies Vomiting. Denies Hematemesis or Hematochezia.  Genitourinary: Denies Dysuria. Denies Urinary frequency. Denies Urinary urgency. Denies Blood in urine.  Endocrine: Denies Cold intolerance. Denies Excessive thirst. Denies Heat intolerance. Denies Weight loss. Denies Weight gain.  Musculoskeletal: Reports Painful joints- followed by pain management. Denies Weakness.  Integumentary: Denies Rash. Denies Itching. Denies Dry skin.  Neurologic: Denies Dizziness. Denies Fainting. Denies Headache.  Psychiatric: Denies Depression. Denies Anxiety. Denies Suicidal/Homicidal ideations.    All Other ROS: Negative except as stated in HPI.       Objective:     /84 (BP Location:  "Right arm, Patient Position: Sitting)   Pulse 73   Resp 16   Ht 6' 1" (1.854 m)   Wt 104.8 kg (231 lb)   SpO2 99%   BMI 30.48 kg/m²     Physical Exam    General: Alert and oriented, No acute distress. Obese.   Head: Normocephalic, Atraumatic.  Eye: Pupils are equal, round and reactive to light, Extraocular movements are intact, Sclera non-icteric.  Ears/Nose/Throat: Normal, Mucosa moist. OP with lymphoid hyperplasia due to postnasal drip, bilateral frontal and maxillary sinus tenderness.   Neck/Thyroid: Supple, Non-tender, No carotid bruit, No palpable thyromegaly or thyroid nodule, Bilateral shotty cervical lymphadenopathy, No JVD, Full range of motion.  Respiratory: Clear to auscultation bilaterally; No wheezes, rales or rhonchi,Non-labored respirations, Symmetrical chest wall expansion.  Cardiovascular: Regular rate and rhythm, S1/S2 normal, No murmurs, rubs or gallops.  Gastrointestinal: Soft, Non-tender, Non-distended, Normal bowel sounds, No palpable organomegaly.  Musculoskeletal: Normal range of motion.  Integumentary: Warm, Dry, Intact, No suspicious lesions or rashes.  Extremities: No clubbing, cyanosis or edema  Neurologic: No focal deficits, Cranial Nerves II-XII are grossly intact, Motor strength normal upper and lower extremities, Sensory exam intact.  Psychiatric: Normal interaction, Coherent speech, Euthymic mood, Appropriate affect         Assessment:       ICD-10-CM ICD-9-CM   1. Class 1 obesity due to excess calories with serious comorbidity and body mass index (BMI) of 30.0 to 30.9 in adult  E66.811 278.00    E66.09 V85.30    Z68.30    2. Subacute sinusitis, unspecified location  J01.90 461.9        Plan:     Problem List Items Addressed This Visit    None  Visit Diagnoses       Class 1 obesity due to excess calories with serious comorbidity and body mass index (BMI) of 30.0 to 30.9 in adult    -  Primary    Subacute sinusitis, unspecified location        Relevant Medications    " azithromycin (Z-AQUILINO) 250 MG tablet    fluticasone propionate (FLONASE) 50 mcg/actuation nasal spray         1. Class 1 obesity due to excess calories with serious comorbidity and body mass index (BMI) of 30.0 to 30.9 in adult  - Diet, exercise, and 10% weight loss encouraged. Continue Rx Zepbound 12.5 mg weekly with close monitoring to help with insulin resistance and obesity. Will titrate Rx Zepbound as needed/tolerated until max dose achieved. Notify M.D. or ER if symptoms persist or worsen, adverse Rx side effects, pancreatitis, temp greater than 100.4, or any acute illness.    Body mass index is 30.48 kg/m².  Goal BMI <30.  Exercise 5 times a week for 30 minutes per day.  Avoid soda, simple sugars, excessive rice, potatoes or bread. Limit fast foods and fried foods.  Choose complex carbs in moderation (example: green vegetables, beans, oatmeal). Eat plenty of fresh fruits and vegetables with lean meats daily.  Do not skip meals. Eat a balanced portion size.  Avoid fad diets. Consider permanent healthy life style changes.      2. Subacute sinusitis, unspecified location  - Rx trial of azithromycin (Z-AQUILINO) 250 MG tablet; Take 2 tablets by mouth on day 1; Take 1 tablet by mouth on days 2-5  Dispense: 6 tablet; Refill: 0  - Rx trial of fluticasone propionate (FLONASE) 50 mcg/actuation nasal spray; 2 sprays (100 mcg total) by Each Nostril route once daily.  Dispense: 16 g; Refill: 5  - Continue Allegra or Claritin as directed. Increase fluid intake. If symptoms are refractory to treatment, will proceed with obtaining a CT sinus and ENT referral for allergy testing. Notify M.D. or ER if symptoms persist or worsen, shortness of breath, chest pain, coughing up blood, temp >100.4, or any acute illness.         Isidoro was seen today for obesity.    Diagnoses and all orders for this visit:    Class 1 obesity due to excess calories with serious comorbidity and body mass index (BMI) of 30.0 to 30.9 in adult    Subacute  sinusitis, unspecified location  -     azithromycin (Z-AQUILINO) 250 MG tablet; Take 2 tablets by mouth on day 1; Take 1 tablet by mouth on days 2-5  -     fluticasone propionate (FLONASE) 50 mcg/actuation nasal spray; 2 sprays (100 mcg total) by Each Nostril route once daily.          Medication List with Changes/Refills   New Medications    AZITHROMYCIN (Z-AQUILINO) 250 MG TABLET    Take 2 tablets by mouth on day 1; Take 1 tablet by mouth on days 2-5       Start Date: 10/16/2024End Date: 10/21/2024    FLUTICASONE PROPIONATE (FLONASE) 50 MCG/ACTUATION NASAL SPRAY    2 sprays (100 mcg total) by Each Nostril route once daily.       Start Date: 10/16/2024End Date: 10/16/2025   Current Medications    ATORVASTATIN (LIPITOR) 10 MG TABLET    Take 10 mg by mouth every evening.       Start Date: 4/23/2022 End Date: --    CIPROFLOXACIN HCL (CIPRO) 500 MG TABLET    Take 500 mg by mouth 2 (two) times daily.       Start Date: 11/27/2022End Date: --    DICLOFENAC (VOLTAREN) 75 MG EC TABLET    Take 75 mg by mouth 2 (two) times daily as needed.       Start Date: 1/25/2022 End Date: --    FEBUXOSTAT (ULORIC) 80 MG TAB    Take 80 mg by mouth as needed.       Start Date: 2/18/2022 End Date: --    FUROSEMIDE (LASIX) 40 MG TABLET    TAKE 1 TABLET BY MOUTH DAILY AS NEEDED       Start Date: 8/26/2024 End Date: --    GABAPENTIN (NEURONTIN) 300 MG CAPSULE    Take 1 capsule (300 mg total) by mouth 3 (three) times daily.       Start Date: 8/26/2024 End Date: --    GUAIFENESIN (MUCINEX) 600 MG 12 HR TABLET    600 mg as needed.       Start Date: --        End Date: --    HYDROCHLOROTHIAZIDE (MICROZIDE) 12.5 MG CAPSULE    Take 12.5 mg by mouth.       Start Date: 9/27/2022 End Date: --    HYDROXYZINE HCL (ATARAX) 25 MG TABLET    TAKE 1 TABLET BY MOUTH AT BEDTIME AS NEEDED FOR INSOMNIA       Start Date: 9/25/2023 End Date: --    LEVOTHYROXINE (SYNTHROID) 25 MCG TABLET    TAKE 1/2 TABLET (12.5 MCG TOTAL) BY MOUTH EVERY DAY BEFORE BREAKFAST.       Start  Date: 3/25/2024 End Date: --    LORATADINE (CLARITIN) 10 MG TABLET    Take 10 mg by mouth daily as needed.       Start Date: 10/4/2021 End Date: --    PANTOPRAZOLE (PROTONIX) 40 MG TABLET    Take 40 mg by mouth once daily.       Start Date: 6/5/2022  End Date: --    TIRZEPATIDE, WEIGHT LOSS, (ZEPBOUND) 12.5 MG/0.5 ML PNIJ    Inject 12.5 mg into the skin every 7 days.       Start Date: 9/19/2024 End Date: 11/18/2024   Discontinued Medications    FUROSEMIDE (LASIX) 40 MG TABLET    Take 1 tablet (40 mg total) by mouth daily as needed.       Start Date: 8/26/2024 End Date: 10/16/2024          Follow up in about 3 months (around 1/16/2025) for Obesity Followup, In office Followup.

## 2024-10-23 ENCOUNTER — PATIENT MESSAGE (OUTPATIENT)
Dept: FAMILY MEDICINE | Facility: CLINIC | Age: 69
End: 2024-10-23
Payer: MEDICARE

## 2024-10-30 DIAGNOSIS — E66.09 CLASS 1 OBESITY DUE TO EXCESS CALORIES WITH SERIOUS COMORBIDITY AND BODY MASS INDEX (BMI) OF 30.0 TO 30.9 IN ADULT: ICD-10-CM

## 2024-10-30 DIAGNOSIS — E66.811 CLASS 1 OBESITY DUE TO EXCESS CALORIES WITH SERIOUS COMORBIDITY AND BODY MASS INDEX (BMI) OF 30.0 TO 30.9 IN ADULT: ICD-10-CM

## 2024-10-30 RX ORDER — TIRZEPATIDE 12.5 MG/.5ML
12.5 INJECTION, SOLUTION SUBCUTANEOUS
Qty: 2 ML | Refills: 1 | Status: SHIPPED | OUTPATIENT
Start: 2024-10-30 | End: 2024-12-29

## 2024-11-14 RX ORDER — FUROSEMIDE 40 MG/1
40 TABLET ORAL DAILY PRN
Qty: 90 TABLET | Refills: 3 | Status: SHIPPED | OUTPATIENT
Start: 2024-11-14

## 2024-11-23 ENCOUNTER — PATIENT MESSAGE (OUTPATIENT)
Dept: FAMILY MEDICINE | Facility: CLINIC | Age: 69
End: 2024-11-23
Payer: MEDICARE

## 2024-12-02 ENCOUNTER — PATIENT MESSAGE (OUTPATIENT)
Dept: FAMILY MEDICINE | Facility: CLINIC | Age: 69
End: 2024-12-02
Payer: MEDICARE

## 2024-12-02 DIAGNOSIS — E66.09 CLASS 1 OBESITY DUE TO EXCESS CALORIES WITH SERIOUS COMORBIDITY AND BODY MASS INDEX (BMI) OF 30.0 TO 30.9 IN ADULT: ICD-10-CM

## 2024-12-02 DIAGNOSIS — E66.811 CLASS 1 OBESITY DUE TO EXCESS CALORIES WITH SERIOUS COMORBIDITY AND BODY MASS INDEX (BMI) OF 30.0 TO 30.9 IN ADULT: ICD-10-CM

## 2024-12-02 RX ORDER — TIRZEPATIDE 12.5 MG/.5ML
12.5 INJECTION, SOLUTION SUBCUTANEOUS
Qty: 2 ML | Refills: 1 | OUTPATIENT
Start: 2024-12-02 | End: 2025-01-31

## 2024-12-02 RX ORDER — TIRZEPATIDE 12.5 MG/.5ML
12.5 INJECTION, SOLUTION SUBCUTANEOUS
Qty: 2 ML | Refills: 1 | Status: SHIPPED | OUTPATIENT
Start: 2024-12-02 | End: 2024-12-03 | Stop reason: SDUPTHER

## 2024-12-03 RX ORDER — TIRZEPATIDE 12.5 MG/.5ML
12.5 INJECTION, SOLUTION SUBCUTANEOUS
Qty: 2 ML | Refills: 1 | Status: SHIPPED | OUTPATIENT
Start: 2024-12-03 | End: 2025-02-01

## 2024-12-23 DIAGNOSIS — E66.811 CLASS 1 OBESITY DUE TO EXCESS CALORIES WITH SERIOUS COMORBIDITY AND BODY MASS INDEX (BMI) OF 30.0 TO 30.9 IN ADULT: ICD-10-CM

## 2024-12-23 DIAGNOSIS — E66.09 CLASS 1 OBESITY DUE TO EXCESS CALORIES WITH SERIOUS COMORBIDITY AND BODY MASS INDEX (BMI) OF 30.0 TO 30.9 IN ADULT: ICD-10-CM

## 2024-12-23 RX ORDER — TIRZEPATIDE 12.5 MG/.5ML
12.5 INJECTION, SOLUTION SUBCUTANEOUS
Qty: 2 ML | Refills: 1 | Status: SHIPPED | OUTPATIENT
Start: 2024-12-23 | End: 2025-02-21

## 2024-12-26 ENCOUNTER — DOCUMENTATION ONLY (OUTPATIENT)
Dept: FAMILY MEDICINE | Facility: CLINIC | Age: 69
End: 2024-12-26
Payer: MEDICARE

## 2024-12-26 LAB
CHOLESTEROL (LIPID PANEL): 129
HDLC SERPL-MCNC: 55 MG/DL
LDLC SERPL CALC-MCNC: 60 MG/DL
TRIGL SERPL-MCNC: 61 MG/DL

## 2024-12-27 ENCOUNTER — PATIENT MESSAGE (OUTPATIENT)
Dept: FAMILY MEDICINE | Facility: CLINIC | Age: 69
End: 2024-12-27
Payer: MEDICARE

## 2025-01-02 ENCOUNTER — PATIENT MESSAGE (OUTPATIENT)
Dept: FAMILY MEDICINE | Facility: CLINIC | Age: 70
End: 2025-01-02
Payer: MEDICARE

## 2025-01-06 ENCOUNTER — DOCUMENTATION ONLY (OUTPATIENT)
Dept: FAMILY MEDICINE | Facility: CLINIC | Age: 70
End: 2025-01-06
Payer: MEDICARE

## 2025-01-15 DIAGNOSIS — M47.26 OSTEOARTHRITIS OF SPINE WITH RADICULOPATHY, LUMBAR REGION: ICD-10-CM

## 2025-01-15 RX ORDER — GABAPENTIN 300 MG/1
300 CAPSULE ORAL 3 TIMES DAILY
Qty: 90 CAPSULE | Refills: 5 | Status: SHIPPED | OUTPATIENT
Start: 2025-01-15

## 2025-01-16 ENCOUNTER — OFFICE VISIT (OUTPATIENT)
Dept: FAMILY MEDICINE | Facility: CLINIC | Age: 70
End: 2025-01-16
Payer: MEDICARE

## 2025-01-16 VITALS
WEIGHT: 235.31 LBS | RESPIRATION RATE: 16 BRPM | OXYGEN SATURATION: 99 % | BODY MASS INDEX: 31.18 KG/M2 | DIASTOLIC BLOOD PRESSURE: 69 MMHG | SYSTOLIC BLOOD PRESSURE: 115 MMHG | TEMPERATURE: 98 F | HEART RATE: 66 BPM | HEIGHT: 73 IN

## 2025-01-16 DIAGNOSIS — E66.09 CLASS 1 OBESITY DUE TO EXCESS CALORIES WITH SERIOUS COMORBIDITY AND BODY MASS INDEX (BMI) OF 31.0 TO 31.9 IN ADULT: Primary | ICD-10-CM

## 2025-01-16 DIAGNOSIS — E66.811 CLASS 1 OBESITY DUE TO EXCESS CALORIES WITH SERIOUS COMORBIDITY AND BODY MASS INDEX (BMI) OF 31.0 TO 31.9 IN ADULT: Primary | ICD-10-CM

## 2025-01-16 PROCEDURE — 99213 OFFICE O/P EST LOW 20 MIN: CPT | Mod: ,,, | Performed by: FAMILY MEDICINE

## 2025-01-16 PROCEDURE — G2211 COMPLEX E/M VISIT ADD ON: HCPCS | Mod: ,,, | Performed by: FAMILY MEDICINE

## 2025-01-16 RX ORDER — TIRZEPATIDE 12.5 MG/.5ML
12.5 INJECTION, SOLUTION SUBCUTANEOUS
Qty: 2 ML | Refills: 2 | Status: SHIPPED | OUTPATIENT
Start: 2025-01-16 | End: 2025-01-27 | Stop reason: SDUPTHER

## 2025-01-16 NOTE — PROGRESS NOTES
Patient ID: [id number]     Chief Complaint: Follow-up and Obesity        HPI:     Isidoro Leal is a 69 y.o. male here today for a follow up obesity.  - He is obese with insulin resistance, he is not interested in surgical weight loss or nutrition referral, he is taking Zepbound 12.5 mg weekly with success, no side effects, he has gained 4 pounds since last visit due to the non-compliance with diet during the holidays, he would like to keep the Rx at this dose, he needs Rx refill today. He denies family history of medullary thyroid cancer or MEN II and personal history of pancreatitis.  - Patient is without any other complaints today.         -------------------------------------    Allergy    Hypercholesterolemia    Hypogonadism in male    Insomnia    Obesity, unspecified    Vitamin D deficiency        Past Surgical History:   Procedure Laterality Date    APPENDECTOMY      SINUS SURGERY         Review of patient's allergies indicates:   Allergen Reactions    Montelukast Anaphylaxis    Clindamycin Other (See Comments)     Other reaction(s): Cutaneous eruption, Weal    Hydrocodone bitartrate     Levofloxacin Other (See Comments)    Penicillins     Propoxyphene n-acetaminophen        Outpatient Medications Marked as Taking for the 1/16/25 encounter (Office Visit) with Cara Fiore MD   Medication Sig Dispense Refill    atorvastatin (LIPITOR) 10 MG tablet Take 10 mg by mouth every evening.      ciprofloxacin HCl (CIPRO) 500 MG tablet Take 500 mg by mouth 2 (two) times daily.      diclofenac (VOLTAREN) 75 MG EC tablet Take 75 mg by mouth 2 (two) times daily as needed.      febuxostat (ULORIC) 80 mg Tab Take 80 mg by mouth as needed.      fluticasone propionate (FLONASE) 50 mcg/actuation nasal spray 2 sprays (100 mcg total) by Each Nostril route once daily. 16 g 5    furosemide (LASIX) 40 MG tablet Take 1 tablet (40 mg total) by mouth daily as needed. 90 tablet 3    gabapentin (NEURONTIN) 300 MG capsule Take 1  capsule (300 mg total) by mouth 3 (three) times daily. 90 capsule 5    guaiFENesin (MUCINEX) 600 mg 12 hr tablet 600 mg as needed.      hydroCHLOROthiazide (MICROZIDE) 12.5 mg capsule Take 12.5 mg by mouth.      hydrOXYzine HCL (ATARAX) 25 MG tablet TAKE 1 TABLET BY MOUTH AT BEDTIME AS NEEDED FOR INSOMNIA 90 tablet 3    levothyroxine (SYNTHROID) 25 MCG tablet TAKE 1/2 TABLET (12.5 MCG TOTAL) BY MOUTH EVERY DAY BEFORE BREAKFAST. 45 tablet 3    loratadine (CLARITIN) 10 mg tablet Take 10 mg by mouth daily as needed.      pantoprazole (PROTONIX) 40 MG tablet Take 40 mg by mouth once daily.      [DISCONTINUED] tirzepatide, weight loss, (ZEPBOUND) 12.5 mg/0.5 mL PnIj Inject 12.5 mg into the skin every 7 days. 2 mL 1       Social History     Socioeconomic History    Marital status:    Tobacco Use    Smoking status: Never    Smokeless tobacco: Never   Substance and Sexual Activity    Alcohol use: Never    Drug use: Never    Sexual activity: Yes     Partners: Female     Social Drivers of Health     Financial Resource Strain: Low Risk  (1/31/2024)    Overall Financial Resource Strain (CARDIA)     Difficulty of Paying Living Expenses: Not hard at all   Food Insecurity: No Food Insecurity (1/31/2024)    Hunger Vital Sign     Worried About Running Out of Food in the Last Year: Never true     Ran Out of Food in the Last Year: Never true   Transportation Needs: No Transportation Needs (1/31/2024)    PRAPARE - Transportation     Lack of Transportation (Medical): No     Lack of Transportation (Non-Medical): No   Physical Activity: Sufficiently Active (5/21/2024)    Exercise Vital Sign     Days of Exercise per Week: 5 days     Minutes of Exercise per Session: 30 min   Stress: No Stress Concern Present (1/31/2024)    [employer] - Occupational Stress Questionnaire     Feeling of Stress : Not at all   Housing Stability: Low Risk  (1/31/2024)    Housing Stability Vital Sign     Unable to Pay for  "Housing in the Last Year: No     Number of Places Lived in the Last Year: 1     Unstable Housing in the Last Year: No        Family History   Problem Relation Name Age of Onset    Hypertension Mother Nancy Leal             Stroke Father Rakesh Leal             Hyperlipidemia Brother Alexandra Leal     Heart disease Brother Alexandra Leal     Hyperlipidemia Brother Justin Leal     Heart disease Brother Justin Leal     Hyperlipidemia Brother      Hyperlipidemia Brother Enzo Leal         Subjective:       Review of Systems:    See HPI for details    Constitutional: Denies Change in appetite. Denies Chills. Denies Fever. Denies Night sweats.  Eye: Denies Blurred vision. Denies Discharge. Denies Eye pain.  ENT: Denies Decreased hearing. Denies Sore throat. Denies Swollen glands.  Respiratory: Denies Cough. Denies Shortness of breath. Denies Shortness of breath with exertion. Denies Wheezing.  Cardiovascular: Denies Chest pain at rest. Denies Chest pain with exertion. Denies Irregular heartbeat. Denies Palpitations.  Gastrointestinal: Denies Abdominal pain. Denies Diarrhea. Denies Nausea. Denies Vomiting. Denies Hematemesis or Hematochezia.  Genitourinary: Denies Dysuria. Denies Urinary frequency. Denies Urinary urgency. Denies Blood in urine.  Endocrine: Denies Cold intolerance. Denies Excessive thirst. Denies Heat intolerance. Denies Weight loss. Denies Weight gain.  Musculoskeletal: Denies Painful joints. Denies Weakness.  Integumentary: Denies Rash. Denies Itching. Denies Dry skin.  Neurologic: Denies Dizziness. Denies Fainting. Denies Headache.  Psychiatric: Denies Depression. Denies Anxiety. Denies Suicidal/Homicidal ideations.    All Other ROS: Negative except as stated in HPI.       Objective:     /69 (BP Location: Left arm, Patient Position: Sitting)   Pulse 66   Temp 97.6 °F (36.4 °C) (Oral)   Resp 16   Ht 6' 1" (1.854 m)   Wt 106.7 kg (235 lb 4.8 oz)   SpO2 99%   BMI 31.04 " kg/m²     Physical Exam    General: Alert and oriented, No acute distress. Obese.   Head: Normocephalic, Atraumatic.  Eye: Pupils are equal, round and reactive to light, Extraocular movements are intact, Sclera non-icteric.  Ears/Nose/Throat: Normal, Mucosa moist,Clear.  Neck/Thyroid: Supple, Non-tender, No carotid bruit, No palpable thyromegaly or thyroid nodule, No lymphadenopathy, No JVD, Full range of motion.  Respiratory: Clear to auscultation bilaterally; No wheezes, rales or rhonchi,Non-labored respirations, Symmetrical chest wall expansion.  Cardiovascular: Regular rate and rhythm, S1/S2 normal, No murmurs, rubs or gallops.  Gastrointestinal: Soft, Non-tender, Non-distended, Normal bowel sounds, No palpable organomegaly.  Musculoskeletal: Normal range of motion.  Integumentary: Warm, Dry, Intact, No suspicious lesions or rashes.  Extremities: No clubbing, cyanosis or edema  Neurologic: No focal deficits, Cranial Nerves II-XII are grossly intact, Motor strength normal upper and lower extremities, Sensory exam intact.  Psychiatric: Normal interaction, Coherent speech, Euthymic mood, Appropriate affect.     *Lab results from 12/26/2024 were reviewed and discussed with patient and patient voices understanding.*      Assessment:       ICD-10-CM ICD-9-CM   1. Class 1 obesity due to excess calories with serious comorbidity and body mass index (BMI) of 31.0 to 31.9 in adult  E66.811 278.00    E66.09 V85.31    Z68.31         Plan:     Problem List Items Addressed This Visit    None  Visit Diagnoses       Class 1 obesity due to excess calories with serious comorbidity and body mass index (BMI) of 31.0 to 31.9 in adult    -  Primary    Relevant Medications    tirzepatide, weight loss, (ZEPBOUND) 12.5 mg/0.5 mL PnIj         1. Class 1 obesity due to excess calories with serious comorbidity and body mass index (BMI) of 31.0 to 31.9 in adult  - tirzepatide, weight loss, (ZEPBOUND) 12.5 mg/0.5 mL PnIj; Inject 12.5 mg into  the skin every 7 days.  Dispense: 2 mL; Refill: 2  - Diet, exercise, and 10% weight loss encouraged. Continue Rx Zepbound 12.5 mg weekly with close monitoring to help with insulin resistance and obesity, Rx Zepbound refilled today. Will titrate Rx Zepbound as needed/tolerated until max dose achieved. Notify M.D. or ER if symptoms persist or worsen, adverse Rx side effects, pancreatitis, temp greater than 100.4, or any acute illness.    Body mass index is 31.04 kg/m².  Goal BMI <30.  Exercise 5 times a week for 30 minutes per day.  Avoid soda, simple sugars, excessive rice, potatoes or bread. Limit fast foods and fried foods.  Choose complex carbs in moderation (example: green vegetables, beans, oatmeal). Eat plenty of fresh fruits and vegetables with lean meats daily.  Do not skip meals. Eat a balanced portion size.  Avoid fad diets. Consider permanent healthy life style changes.        Isidoro was seen today for follow-up and obesity.    Diagnoses and all orders for this visit:    Class 1 obesity due to excess calories with serious comorbidity and body mass index (BMI) of 31.0 to 31.9 in adult  -     tirzepatide, weight loss, (ZEPBOUND) 12.5 mg/0.5 mL PnIj; Inject 12.5 mg into the skin every 7 days.          Medication List with Changes/Refills   Current Medications    ATORVASTATIN (LIPITOR) 10 MG TABLET    Take 10 mg by mouth every evening.       Start Date: 4/23/2022 End Date: --    CIPROFLOXACIN HCL (CIPRO) 500 MG TABLET    Take 500 mg by mouth 2 (two) times daily.       Start Date: 11/27/2022End Date: --    DICLOFENAC (VOLTAREN) 75 MG EC TABLET    Take 75 mg by mouth 2 (two) times daily as needed.       Start Date: 1/25/2022 End Date: --    FEBUXOSTAT (ULORIC) 80 MG TAB    Take 80 mg by mouth as needed.       Start Date: 2/18/2022 End Date: --    FLUTICASONE PROPIONATE (FLONASE) 50 MCG/ACTUATION NASAL SPRAY    2 sprays (100 mcg total) by Each Nostril route once daily.       Start Date: 10/16/2024End Date:  10/16/2025    FUROSEMIDE (LASIX) 40 MG TABLET    Take 1 tablet (40 mg total) by mouth daily as needed.       Start Date: 11/14/2024End Date: --    GABAPENTIN (NEURONTIN) 300 MG CAPSULE    Take 1 capsule (300 mg total) by mouth 3 (three) times daily.       Start Date: 1/15/2025 End Date: --    GUAIFENESIN (MUCINEX) 600 MG 12 HR TABLET    600 mg as needed.       Start Date: --        End Date: --    HYDROCHLOROTHIAZIDE (MICROZIDE) 12.5 MG CAPSULE    Take 12.5 mg by mouth.       Start Date: 9/27/2022 End Date: --    HYDROXYZINE HCL (ATARAX) 25 MG TABLET    TAKE 1 TABLET BY MOUTH AT BEDTIME AS NEEDED FOR INSOMNIA       Start Date: 9/25/2023 End Date: --    LEVOTHYROXINE (SYNTHROID) 25 MCG TABLET    TAKE 1/2 TABLET (12.5 MCG TOTAL) BY MOUTH EVERY DAY BEFORE BREAKFAST.       Start Date: 3/25/2024 End Date: --    LORATADINE (CLARITIN) 10 MG TABLET    Take 10 mg by mouth daily as needed.       Start Date: 10/4/2021 End Date: --    PANTOPRAZOLE (PROTONIX) 40 MG TABLET    Take 40 mg by mouth once daily.       Start Date: 6/5/2022  End Date: --   Changed and/or Refilled Medications    Modified Medication Previous Medication    TIRZEPATIDE, WEIGHT LOSS, (ZEPBOUND) 12.5 MG/0.5 ML PNIJ tirzepatide, weight loss, (ZEPBOUND) 12.5 mg/0.5 mL PnIj       Inject 12.5 mg into the skin every 7 days.    Inject 12.5 mg into the skin every 7 days.       Start Date: 1/16/2025 End Date: 4/16/2025    Start Date: 12/23/2024End Date: 1/16/2025          Follow up in about 3 months (around 4/16/2025) for Obesity Followup, In office Followup.

## 2025-01-27 DIAGNOSIS — E66.09 CLASS 1 OBESITY DUE TO EXCESS CALORIES WITH SERIOUS COMORBIDITY AND BODY MASS INDEX (BMI) OF 31.0 TO 31.9 IN ADULT: ICD-10-CM

## 2025-01-27 DIAGNOSIS — E66.811 CLASS 1 OBESITY DUE TO EXCESS CALORIES WITH SERIOUS COMORBIDITY AND BODY MASS INDEX (BMI) OF 31.0 TO 31.9 IN ADULT: ICD-10-CM

## 2025-01-27 RX ORDER — TIRZEPATIDE 12.5 MG/.5ML
12.5 INJECTION, SOLUTION SUBCUTANEOUS
Qty: 2 ML | Refills: 2 | Status: SHIPPED | OUTPATIENT
Start: 2025-01-27 | End: 2025-04-27

## 2025-01-31 ENCOUNTER — PATIENT MESSAGE (OUTPATIENT)
Dept: FAMILY MEDICINE | Facility: CLINIC | Age: 70
End: 2025-01-31
Payer: MEDICARE

## 2025-02-24 DIAGNOSIS — E66.09 CLASS 1 OBESITY DUE TO EXCESS CALORIES WITH SERIOUS COMORBIDITY AND BODY MASS INDEX (BMI) OF 31.0 TO 31.9 IN ADULT: ICD-10-CM

## 2025-02-24 DIAGNOSIS — E66.811 CLASS 1 OBESITY DUE TO EXCESS CALORIES WITH SERIOUS COMORBIDITY AND BODY MASS INDEX (BMI) OF 31.0 TO 31.9 IN ADULT: ICD-10-CM

## 2025-02-24 DIAGNOSIS — E03.8 OTHER SPECIFIED HYPOTHYROIDISM: ICD-10-CM

## 2025-02-24 RX ORDER — TIRZEPATIDE 12.5 MG/.5ML
12.5 INJECTION, SOLUTION SUBCUTANEOUS
Qty: 2 ML | Refills: 2 | Status: SHIPPED | OUTPATIENT
Start: 2025-02-24 | End: 2025-05-25

## 2025-02-24 RX ORDER — LEVOTHYROXINE SODIUM 25 UG/1
25 TABLET ORAL
Qty: 90 TABLET | Refills: 3 | Status: SHIPPED | OUTPATIENT
Start: 2025-02-24

## 2025-02-26 ENCOUNTER — PATIENT MESSAGE (OUTPATIENT)
Dept: FAMILY MEDICINE | Facility: CLINIC | Age: 70
End: 2025-02-26
Payer: MEDICARE

## 2025-03-24 DIAGNOSIS — E66.811 CLASS 1 OBESITY DUE TO EXCESS CALORIES WITH SERIOUS COMORBIDITY AND BODY MASS INDEX (BMI) OF 31.0 TO 31.9 IN ADULT: ICD-10-CM

## 2025-03-24 DIAGNOSIS — E66.09 CLASS 1 OBESITY DUE TO EXCESS CALORIES WITH SERIOUS COMORBIDITY AND BODY MASS INDEX (BMI) OF 31.0 TO 31.9 IN ADULT: ICD-10-CM

## 2025-03-24 RX ORDER — TIRZEPATIDE 12.5 MG/.5ML
12.5 INJECTION, SOLUTION SUBCUTANEOUS
Qty: 2 ML | Refills: 2 | Status: SHIPPED | OUTPATIENT
Start: 2025-03-24 | End: 2025-06-22

## 2025-04-08 DIAGNOSIS — J01.90 SUBACUTE SINUSITIS, UNSPECIFIED LOCATION: ICD-10-CM

## 2025-04-08 RX ORDER — FLUTICASONE PROPIONATE 50 MCG
2 SPRAY, SUSPENSION (ML) NASAL
Qty: 16 ML | Refills: 5 | Status: SHIPPED | OUTPATIENT
Start: 2025-04-08

## 2025-04-21 DIAGNOSIS — E66.09 CLASS 1 OBESITY DUE TO EXCESS CALORIES WITH SERIOUS COMORBIDITY AND BODY MASS INDEX (BMI) OF 31.0 TO 31.9 IN ADULT: ICD-10-CM

## 2025-04-21 DIAGNOSIS — E66.811 CLASS 1 OBESITY DUE TO EXCESS CALORIES WITH SERIOUS COMORBIDITY AND BODY MASS INDEX (BMI) OF 31.0 TO 31.9 IN ADULT: ICD-10-CM

## 2025-04-22 RX ORDER — TIRZEPATIDE 12.5 MG/.5ML
12.5 INJECTION, SOLUTION SUBCUTANEOUS
Qty: 2 ML | Refills: 2 | Status: SHIPPED | OUTPATIENT
Start: 2025-04-22 | End: 2025-07-21

## 2025-04-23 ENCOUNTER — OFFICE VISIT (OUTPATIENT)
Dept: FAMILY MEDICINE | Facility: CLINIC | Age: 70
End: 2025-04-23
Payer: MEDICARE

## 2025-04-23 VITALS
RESPIRATION RATE: 16 BRPM | TEMPERATURE: 98 F | BODY MASS INDEX: 30.35 KG/M2 | OXYGEN SATURATION: 98 % | DIASTOLIC BLOOD PRESSURE: 72 MMHG | WEIGHT: 229 LBS | HEIGHT: 73 IN | HEART RATE: 71 BPM | SYSTOLIC BLOOD PRESSURE: 108 MMHG

## 2025-04-23 DIAGNOSIS — E66.09 CLASS 1 OBESITY DUE TO EXCESS CALORIES WITH SERIOUS COMORBIDITY AND BODY MASS INDEX (BMI) OF 30.0 TO 30.9 IN ADULT: Primary | ICD-10-CM

## 2025-04-23 DIAGNOSIS — E66.811 CLASS 1 OBESITY DUE TO EXCESS CALORIES WITH SERIOUS COMORBIDITY AND BODY MASS INDEX (BMI) OF 30.0 TO 30.9 IN ADULT: Primary | ICD-10-CM

## 2025-04-23 NOTE — PROGRESS NOTES
Patient ID: [id number]     Chief Complaint: Follow-up and Obesity        HPI:     Isidoro Leal is a 69 y.o. male here today for a follow up obesity.   - He is obese with insulin resistance, he is not interested in surgical weight loss or nutrition referral, he is taking Zepbound 12.5 mg weekly with success, no side effects, he has lost 6 pounds since last visit due to the compliance with diet , he would like to keep the Rx at this dose, he needs Rx refill today. He denies family history of medullary thyroid cancer or MEN II and personal history of pancreatitis.  - Patient is without any other complaints today.         -------------------------------------    Allergy    Hypercholesterolemia    Hypogonadism in male    Insomnia    Obesity, unspecified    Vitamin D deficiency        Past Surgical History:   Procedure Laterality Date    APPENDECTOMY      SINUS SURGERY         Review of patient's allergies indicates:   Allergen Reactions    Montelukast Anaphylaxis    Clindamycin Other (See Comments)     Other reaction(s): Cutaneous eruption, Weal    Hydrocodone bitartrate     Levofloxacin Other (See Comments)    Penicillins     Propoxyphene n-acetaminophen        Outpatient Medications Marked as Taking for the 4/23/25 encounter (Office Visit) with Cara Fiore MD   Medication Sig Dispense Refill    atorvastatin (LIPITOR) 10 MG tablet Take 10 mg by mouth every evening.      ciprofloxacin HCl (CIPRO) 500 MG tablet Take 500 mg by mouth 2 (two) times daily.      diclofenac (VOLTAREN) 75 MG EC tablet Take 75 mg by mouth 2 (two) times daily as needed.      febuxostat (ULORIC) 80 mg Tab Take 80 mg by mouth as needed.      fluticasone propionate (FLONASE) 50 mcg/actuation nasal spray SPRAY 2 SPRAYS BY EACH NOSTRIL ROUTE ONCE DAILY. 16 mL 5    furosemide (LASIX) 40 MG tablet Take 1 tablet (40 mg total) by mouth daily as needed. 90 tablet 3    gabapentin (NEURONTIN) 300 MG capsule Take 1 capsule (300 mg total) by mouth 3  (three) times daily. 90 capsule 5    guaiFENesin (MUCINEX) 600 mg 12 hr tablet 600 mg as needed.      hydroCHLOROthiazide (MICROZIDE) 12.5 mg capsule Take 12.5 mg by mouth.      hydrOXYzine HCL (ATARAX) 25 MG tablet TAKE 1 TABLET BY MOUTH AT BEDTIME AS NEEDED FOR INSOMNIA 90 tablet 3    levothyroxine (SYNTHROID) 25 MCG tablet Take 1 tablet (25 mcg total) by mouth before breakfast. 90 tablet 3    loratadine (CLARITIN) 10 mg tablet Take 10 mg by mouth daily as needed.      pantoprazole (PROTONIX) 40 MG tablet Take 40 mg by mouth once daily.      tirzepatide, weight loss, (ZEPBOUND) 12.5 mg/0.5 mL PnIj Inject 12.5 mg into the skin every 7 days. 2 mL 2       Social History[1]     Family History   Problem Relation Name Age of Onset    Hypertension Mother Nancy Leal             Stroke Father Rakesh JIMÉNEZ Elvis             Hyperlipidemia Brother Alexandra LONDONOPennie Elvis     Heart disease Brother Alexandra Leal     Hyperlipidemia Brother Justin Leal     Heart disease Brother Justin Leal     Hyperlipidemia Brother      Hyperlipidemia Brother Enzo ROMEROPennie Leal         Subjective:       Review of Systems:    See HPI for details    Constitutional: Denies Change in appetite. Denies Chills. Denies Fever. Denies Night sweats.  Eye: Denies Blurred vision. Denies Discharge. Denies Eye pain.  ENT: Denies Decreased hearing. Denies Sore throat. Denies Swollen glands.  Respiratory: Denies Cough. Denies Shortness of breath. Denies Shortness of breath with exertion. Denies Wheezing.  Cardiovascular: Denies Chest pain at rest. Denies Chest pain with exertion. Denies Irregular heartbeat. Denies Palpitations.  Gastrointestinal: Denies Abdominal pain. Denies Diarrhea. Denies Nausea. Denies Vomiting. Denies Hematemesis or Hematochezia.  Genitourinary: Denies Dysuria. Denies Urinary frequency. Denies Urinary urgency. Denies Blood in urine.  Endocrine: Denies Cold intolerance. Denies Excessive thirst. Denies Heat intolerance. Denies Weight loss.  "Denies Weight gain.  Musculoskeletal: Denies Painful joints. Denies Weakness.  Integumentary: Denies Rash. Denies Itching. Denies Dry skin.  Neurologic: Denies Dizziness. Denies Fainting. Denies Headache.  Psychiatric: Denies Depression. Denies Anxiety. Denies Suicidal/Homicidal ideations.    All Other ROS: Negative except as stated in HPI.       Objective:     /72 (BP Location: Right arm, Patient Position: Sitting)   Pulse 71   Temp 97.6 °F (36.4 °C) (Oral)   Resp 16   Ht 6' 1" (1.854 m)   Wt 103.9 kg (229 lb)   SpO2 98%   BMI 30.21 kg/m²     Physical Exam    General: Alert and oriented, No acute distress. Obese.  Head: Normocephalic, Atraumatic.  Eye: Pupils are equal, round and reactive to light, Extraocular movements are intact, Sclera non-icteric.  Ears/Nose/Throat: Normal, Mucosa moist,Clear.  Neck/Thyroid: Supple, Non-tender, No carotid bruit, No palpable thyromegaly or thyroid nodule, No lymphadenopathy, No JVD, Full range of motion.  Respiratory: Clear to auscultation bilaterally; No wheezes, rales or rhonchi,Non-labored respirations, Symmetrical chest wall expansion.  Cardiovascular: Regular rate and rhythm, S1/S2 normal, No murmurs, rubs or gallops.  Gastrointestinal: Soft, Non-tender, Non-distended, Normal bowel sounds, No palpable organomegaly.  Musculoskeletal: Normal range of motion.  Integumentary: Warm, Dry, Intact, No suspicious lesions or rashes.  Extremities: No clubbing, cyanosis or edema  Neurologic: No focal deficits, Cranial Nerves II-XII are grossly intact, Motor strength normal upper and lower extremities, Sensory exam intact.  Psychiatric: Normal interaction, Coherent speech, Euthymic mood, Appropriate affect         Assessment:       ICD-10-CM ICD-9-CM   1. Class 1 obesity due to excess calories with serious comorbidity and body mass index (BMI) of 30.0 to 30.9 in adult  E66.811 278.00    E66.09 V85.30    Z68.30         Plan:     Problem List Items Addressed This Visit  "   None  Visit Diagnoses         Class 1 obesity due to excess calories with serious comorbidity and body mass index (BMI) of 30.0 to 30.9 in adult    -  Primary         1. Class 1 obesity due to excess calories with serious comorbidity and body mass index (BMI) of 30.0 to 30.9 in adult  - Diet, exercise, and 10% weight loss encouraged. Continue Rx Zepbound 12.5 mg weekly with close monitoring to help with insulin resistance and obesity, Rx Zepbound refilled today. Will titrate Rx Zepbound as needed/tolerated until max dose achieved. Notify M.D. or ER if symptoms persist or worsen, adverse Rx side effects, pancreatitis, temp greater than 100.4, or any acute illness.    Body mass index is 30.21 kg/m².  Goal BMI <30.  Exercise 5 times a week for 30 minutes per day.  Avoid soda, simple sugars, excessive rice, potatoes or bread. Limit fast foods and fried foods.  Choose complex carbs in moderation (example: green vegetables, beans, oatmeal). Eat plenty of fresh fruits and vegetables with lean meats daily.  Do not skip meals. Eat a balanced portion size.  Avoid fad diets. Consider permanent healthy life style changes.        Isidoro was seen today for follow-up and obesity.    Diagnoses and all orders for this visit:    Class 1 obesity due to excess calories with serious comorbidity and body mass index (BMI) of 30.0 to 30.9 in adult          Medication List with Changes/Refills   Current Medications    ATORVASTATIN (LIPITOR) 10 MG TABLET    Take 10 mg by mouth every evening.       Start Date: 4/23/2022 End Date: --    CIPROFLOXACIN HCL (CIPRO) 500 MG TABLET    Take 500 mg by mouth 2 (two) times daily.       Start Date: 11/27/2022End Date: --    DICLOFENAC (VOLTAREN) 75 MG EC TABLET    Take 75 mg by mouth 2 (two) times daily as needed.       Start Date: 1/25/2022 End Date: --    FEBUXOSTAT (ULORIC) 80 MG TAB    Take 80 mg by mouth as needed.       Start Date: 2/18/2022 End Date: --    FLUTICASONE PROPIONATE (FLONASE) 50  MCG/ACTUATION NASAL SPRAY    SPRAY 2 SPRAYS BY EACH NOSTRIL ROUTE ONCE DAILY.       Start Date: 4/8/2025  End Date: --    FUROSEMIDE (LASIX) 40 MG TABLET    Take 1 tablet (40 mg total) by mouth daily as needed.       Start Date: 11/14/2024End Date: --    GABAPENTIN (NEURONTIN) 300 MG CAPSULE    Take 1 capsule (300 mg total) by mouth 3 (three) times daily.       Start Date: 1/15/2025 End Date: --    GUAIFENESIN (MUCINEX) 600 MG 12 HR TABLET    600 mg as needed.       Start Date: --        End Date: --    HYDROCHLOROTHIAZIDE (MICROZIDE) 12.5 MG CAPSULE    Take 12.5 mg by mouth.       Start Date: 9/27/2022 End Date: --    HYDROXYZINE HCL (ATARAX) 25 MG TABLET    TAKE 1 TABLET BY MOUTH AT BEDTIME AS NEEDED FOR INSOMNIA       Start Date: 9/25/2023 End Date: --    LEVOTHYROXINE (SYNTHROID) 25 MCG TABLET    Take 1 tablet (25 mcg total) by mouth before breakfast.       Start Date: 2/24/2025 End Date: --    LORATADINE (CLARITIN) 10 MG TABLET    Take 10 mg by mouth daily as needed.       Start Date: 10/4/2021 End Date: --    PANTOPRAZOLE (PROTONIX) 40 MG TABLET    Take 40 mg by mouth once daily.       Start Date: 6/5/2022  End Date: --    TIRZEPATIDE, WEIGHT LOSS, (ZEPBOUND) 12.5 MG/0.5 ML PNIJ    Inject 12.5 mg into the skin every 7 days.       Start Date: 4/22/2025 End Date: 7/21/2025          Follow up in about 1 month (around 5/23/2025) for Wellness.        [1]   Social History  Socioeconomic History    Marital status:    Tobacco Use    Smoking status: Never    Smokeless tobacco: Never   Substance and Sexual Activity    Alcohol use: Never    Drug use: Never    Sexual activity: Yes     Partners: Female     Social Drivers of Health     Financial Resource Strain: Low Risk  (2/4/2025)    Overall Financial Resource Strain (CARDIA)     Difficulty of Paying Living Expenses: Not hard at all   Food Insecurity: No Food Insecurity (2/4/2025)    Hunger Vital Sign     Worried About Running Out of Food in the Last Year: Never  true     Ran Out of Food in the Last Year: Never true   Transportation Needs: No Transportation Needs (1/31/2024)    PRAPARE - Transportation     Lack of Transportation (Medical): No     Lack of Transportation (Non-Medical): No   Physical Activity: Insufficiently Active (2/4/2025)    Exercise Vital Sign     Days of Exercise per Week: 2 days     Minutes of Exercise per Session: 30 min   Stress: No Stress Concern Present (2/4/2025)    [employer] [place] [employer] - Occupational Stress Questionnaire     Feeling of Stress : Only a little   Housing Stability: Low Risk  (1/31/2024)    Housing Stability Vital Sign     Unable to Pay for Housing in the Last Year: No     Number of Places Lived in the Last Year: 1     Unstable Housing in the Last Year: No

## 2025-05-18 DIAGNOSIS — E66.09 CLASS 1 OBESITY DUE TO EXCESS CALORIES WITH SERIOUS COMORBIDITY AND BODY MASS INDEX (BMI) OF 31.0 TO 31.9 IN ADULT: ICD-10-CM

## 2025-05-18 DIAGNOSIS — E03.8 OTHER SPECIFIED HYPOTHYROIDISM: ICD-10-CM

## 2025-05-18 DIAGNOSIS — E66.811 CLASS 1 OBESITY DUE TO EXCESS CALORIES WITH SERIOUS COMORBIDITY AND BODY MASS INDEX (BMI) OF 31.0 TO 31.9 IN ADULT: ICD-10-CM

## 2025-05-19 RX ORDER — TIRZEPATIDE 12.5 MG/.5ML
12.5 INJECTION, SOLUTION SUBCUTANEOUS
Qty: 2 ML | Refills: 2 | Status: SHIPPED | OUTPATIENT
Start: 2025-05-19 | End: 2025-05-23

## 2025-05-19 RX ORDER — LEVOTHYROXINE SODIUM 25 UG/1
25 TABLET ORAL
Qty: 90 TABLET | Refills: 3 | Status: SHIPPED | OUTPATIENT
Start: 2025-05-19

## 2025-05-20 ENCOUNTER — TELEPHONE (OUTPATIENT)
Dept: FAMILY MEDICINE | Facility: CLINIC | Age: 70
End: 2025-05-20
Payer: MEDICARE

## 2025-05-20 DIAGNOSIS — Z00.00 MEDICARE ANNUAL WELLNESS VISIT, SUBSEQUENT: Primary | ICD-10-CM

## 2025-05-21 ENCOUNTER — PATIENT MESSAGE (OUTPATIENT)
Dept: FAMILY MEDICINE | Facility: CLINIC | Age: 70
End: 2025-05-21

## 2025-05-21 LAB
CHOLEST SERPL-MCNC: 92 MG/DL
CHOLEST SERPL-MSCNC: 109 MG/DL (ref 0–200)
HBA1C MFR BLD: 5 % (ref 4–6)
HDLC SERPL-MCNC: 41 MG/DL
LDLC SERPL CALC-MCNC: 50 MG/DL (ref 0–160)
NONHDLC SERPL-MCNC: 68 MG/DL

## 2025-05-22 ENCOUNTER — DOCUMENTATION ONLY (OUTPATIENT)
Dept: FAMILY MEDICINE | Facility: CLINIC | Age: 70
End: 2025-05-22
Payer: MEDICARE

## 2025-05-22 ENCOUNTER — RESULTS FOLLOW-UP (OUTPATIENT)
Dept: FAMILY MEDICINE | Facility: CLINIC | Age: 70
End: 2025-05-22

## 2025-05-23 ENCOUNTER — OFFICE VISIT (OUTPATIENT)
Dept: FAMILY MEDICINE | Facility: CLINIC | Age: 70
End: 2025-05-23
Payer: MEDICARE

## 2025-05-23 VITALS
TEMPERATURE: 98 F | SYSTOLIC BLOOD PRESSURE: 137 MMHG | BODY MASS INDEX: 28.89 KG/M2 | OXYGEN SATURATION: 98 % | RESPIRATION RATE: 18 BRPM | WEIGHT: 218 LBS | HEIGHT: 73 IN | HEART RATE: 80 BPM | DIASTOLIC BLOOD PRESSURE: 84 MMHG

## 2025-05-23 DIAGNOSIS — Z12.5 SCREENING PSA (PROSTATE SPECIFIC ANTIGEN): ICD-10-CM

## 2025-05-23 DIAGNOSIS — I10 PRIMARY HYPERTENSION: ICD-10-CM

## 2025-05-23 DIAGNOSIS — E29.1 MALE HYPOGONADISM: ICD-10-CM

## 2025-05-23 DIAGNOSIS — E03.8 OTHER SPECIFIED HYPOTHYROIDISM: ICD-10-CM

## 2025-05-23 DIAGNOSIS — E66.3 OVERWEIGHT (BMI 25.0-29.9): ICD-10-CM

## 2025-05-23 DIAGNOSIS — E78.49 ESSENTIAL FAMILIAL HYPERLIPIDEMIA: ICD-10-CM

## 2025-05-23 DIAGNOSIS — Z00.00 MEDICARE ANNUAL WELLNESS VISIT, SUBSEQUENT: Primary | ICD-10-CM

## 2025-05-23 RX ORDER — TIRZEPATIDE 15 MG/.5ML
15 INJECTION, SOLUTION SUBCUTANEOUS
Qty: 2 ML | Refills: 5 | Status: SHIPPED | OUTPATIENT
Start: 2025-05-23 | End: 2025-11-19

## 2025-05-23 RX ORDER — DIPHENOXYLATE HYDROCHLORIDE AND ATROPINE SULFATE 2.5; .025 MG/1; MG/1
1 TABLET ORAL EVERY 4 HOURS PRN
COMMUNITY
Start: 2025-04-28

## 2025-05-23 NOTE — PROGRESS NOTES
Patient ID: [id number]     Chief Complaint: Medicare AWV        HPI:     Isidoro Leal is a 70 y.o. male here today for a Medicare Wellness.   Well Adult History   The patient presents for well adult exam, The patient's general health status is described as good. The patient's diet is described as balanced. Exercise: occasional. Associated symptoms consist of denies weight loss, denies weight gain, denies fatigue, denies headache, denies hearing loss and denies vision changes. Additional pertinent history: last dental exam: goes every 6 months (with Dr. Ross, Dr. Ramírez Man), last eye exam: 2025, with [facility] (wears eyeglasses, monitoring cataracts), last Cologuard: 02/08/2023 (Negative), needs Cologuard in 02/2026, seat belt use, occasional caffeine use (coffee), tobacco use none, no alcohol use and He had labs done on 05/21/2025, here to discuss the results today, he needs PSA level done only. He takes MVI daily. He will get RSV vaccine from his pharmacy, he is UTD on all vaccines. Patient also reports history of male hypogonadism/low testosterone level that was followed by Urology, reports that he is easily emotional, hasn't had testosterone levels checked in over a year. He also has a history of gout that is controlled with Uloric, he denies adverse Rx side effects. HTN and hypercholesterolemia are well controlled with current treatment regimen. He does see Cardiology (Dr. Riojas) as scheduled, he has an appointment scheduled for next month. Hypothyroidism is controlled with Rx, no side effects, asymptomatic. He does followup with Spine Ortho (Dr. Jiménez) as scheduled for chronic LBP, awaiting LESI, if no improvement. He does followup with ENT (Dr. Heladio Rodriguez) as scheduled. He does followup with Dermatology (Dr. Arora) for skin care.   - He is obese with insulin resistance, he is not interested in surgical weight loss or nutrition referral, he is taking Zepbound 12.5 mg weekly with success, no  side effects, he has lost 11 pounds since last visit, he is ready to proceed with increased dose of Zepbound. He denies family history of medullary thyroid cancer or MEN II and personal history of pancreatitis.   - Patient is without any other complaints today.    denies Urinary leakage.  denies Recent falls or balance difficulty.   admits to Daily exercise or physical activity.  denies Depression, stress, anxiety, or emotional lability.   denies The need for healthcare treatment including a cane/walker, blood pressure monitoring, or regular vision/hearing tests.       A separate E/M code has been provided to evaluate additional complaints that the patient would like addressed during the dedicated Medicare Wellness Exam.    Patient Care Team:  Cara Fiore MD as PCP - General (Family Medicine)  Gina Castellanos PharmD as Hyperlipidemia Digital Medicine Clinician (Pharmacist)  Cara Fiore MD as Hyperlipidemia Digital Medicine Responsible Provider (Family Medicine)     Opioid Screening: Patient medication list reviewed, patient is not taking prescription opioids. Patient is not using additional opioids than prescribed. Patient is at low risk of substance abuse based on this opioid use history.      Advance Care Planning     Date: 05/23/2025    Power of   I initiated the process of voluntary advance care planning today and explained the importance of this process to the patient.  I introduced the concept of advance directives to the patient, as well. Then the patient received detailed information about the importance of designating a Health Care Power of  (HCPOA). He was also instructed to communicate with this person about their wishes for future healthcare, should he become sick and lose decision-making capacity. The patient has previously appointed a HCPOA. After our discussion, the patient has decided to complete a HCPOA and has appointed his daughter, health care agent:  Stephany  [facility] agent number:  [phone]. I encouraged him to communicate with this person about their wishes for future healthcare, should he become sick and lose decision-making capacity.      A total of 5 min was spent on advance care planning, goals of care discussion, emotional support, formulating and communicating prognosis and exploring burden/benefit of various approaches of treatment. This discussion occurred on a fully voluntary basis with the verbal consent of the patient and/or family.        -------------------------------------    Allergy    Hypercholesterolemia    Hypogonadism in male    Insomnia    Obesity, unspecified    Vitamin D deficiency        Past Surgical History:   Procedure Laterality Date    APPENDECTOMY      SINUS SURGERY         Review of patient's allergies indicates:   Allergen Reactions    Montelukast Anaphylaxis    Clindamycin Other (See Comments)     Other reaction(s): Cutaneous eruption, Weal    Hydrocodone bitartrate     Levofloxacin Other (See Comments)    Penicillins     Propoxyphene n-acetaminophen        Outpatient Medications Marked as Taking for the 5/23/25 encounter (Office Visit) with Cara Fiore MD   Medication Sig Dispense Refill    atorvastatin (LIPITOR) 10 MG tablet Take 10 mg by mouth every evening.      ciprofloxacin HCl (CIPRO) 500 MG tablet Take 500 mg by mouth 2 (two) times daily.      diclofenac (VOLTAREN) 75 MG EC tablet Take 75 mg by mouth 2 (two) times daily as needed.      diphenoxylate-atropine 2.5-0.025 mg (LOMOTIL) 2.5-0.025 mg per tablet Take 1 tablet by mouth every 4 (four) hours as needed.      febuxostat (ULORIC) 80 mg Tab Take 80 mg by mouth as needed.      fluticasone propionate (FLONASE) 50 mcg/actuation nasal spray SPRAY 2 SPRAYS BY EACH NOSTRIL ROUTE ONCE DAILY. 16 mL 5    furosemide (LASIX) 40 MG tablet Take 1 tablet (40 mg total) by mouth daily as needed. 90 tablet 3    gabapentin (NEURONTIN) 300 MG capsule Take 1 capsule (300  mg total) by mouth 3 (three) times daily. 90 capsule 5    guaiFENesin (MUCINEX) 600 mg 12 hr tablet 600 mg as needed.      hydroCHLOROthiazide (MICROZIDE) 12.5 mg capsule Take 12.5 mg by mouth.      hydrOXYzine HCL (ATARAX) 25 MG tablet TAKE 1 TABLET BY MOUTH AT BEDTIME AS NEEDED FOR INSOMNIA 90 tablet 3    levothyroxine (SYNTHROID) 25 MCG tablet Take 1 tablet (25 mcg total) by mouth before breakfast. 90 tablet 3    loratadine (CLARITIN) 10 mg tablet Take 10 mg by mouth daily as needed.      pantoprazole (PROTONIX) 40 MG tablet Take 40 mg by mouth once daily.      [DISCONTINUED] tirzepatide, weight loss, (ZEPBOUND) 12.5 mg/0.5 mL PnIj Inject 12.5 mg into the skin every 7 days. 2 mL 2       Social History[1]     Family History   Problem Relation Name Age of Onset    Hypertension Mother Nancy Leal             Stroke Father Rakesh JIMÉNEZ Elvis             Hyperlipidemia Brother Alexandra LONDONO. Elvis     Heart disease Brother Alexandra Leal     Hyperlipidemia Brother Justin Leal     Heart disease Brother Justin Leal     Hyperlipidemia Brother      Hyperlipidemia Brother Enzo ROMEROPennie Leal         Subjective:     ROS      See HPI for details    Constitutional: Denies Change in appetite. Denies Chills. Denies Fever. Denies Night sweats.  Eye: Denies Blurred vision. Denies Discharge. Denies Eye pain.  ENT: Denies Decreased hearing. Denies Sore throat. Denies Swollen glands.  Respiratory: Denies Cough. Denies Shortness of breath. Denies Shortness of breath with exertion. Denies Wheezing.  Cardiovascular: Denies Chest pain at rest. Denies Chest pain with exertion. Denies Irregular heartbeat. Denies Palpitations.  Gastrointestinal: Denies Abdominal pain. Denies Diarrhea. Denies Nausea. Denies Vomiting. Denies Hematemesis or Hematochezia.  Genitourinary: Denies Dysuria. Denies Urinary frequency. Denies Urinary urgency. Denies Blood in urine.  Endocrine: Denies Cold intolerance. Denies Excessive thirst. Denies Heat  "intolerance. Denies Weight loss. Denies Weight gain.  Musculoskeletal: Denies Painful joints. Denies Weakness.  Integumentary: Denies Rash. Denies Itching. Denies Dry skin.  Neurologic: Denies Dizziness. Denies Fainting. Denies Headache.  Psychiatric: Denies Depression. Denies Anxiety. Denies Suicidal/Homicidal ideations.    All Other ROS: Negative except as stated in HPI.       Objective:     /84 (BP Location: Right arm, Patient Position: Sitting)   Pulse 80   Temp 98.3 °F (36.8 °C) (Oral)   Resp 18   Ht 6' 1" (1.854 m)   Wt 98.9 kg (218 lb)   SpO2 98%   BMI 28.76 kg/m²     Physical Exam    General: Alert and oriented, No acute distress.  Head: Normocephalic, Atraumatic.  Eye: Pupils are equal, round and reactive to light, Extraocular movements are intact, Sclera non-icteric.  Ears/Nose/Throat: Normal, Mucosa moist,Clear.  Neck/Thyroid: Supple, Non-tender, No carotid bruit, No palpable thyromegaly or thyroid nodule, No lymphadenopathy, No JVD, Full range of motion.  Respiratory: Clear to auscultation bilaterally; No wheezes, rales or rhonchi,Non-labored respirations, Symmetrical chest wall expansion.  Cardiovascular: Regular rate and rhythm, S1/S2 normal, No murmurs, rubs or gallops.  Gastrointestinal: Soft, Non-tender, Non-distended, Normal bowel sounds, No palpable organomegaly.  Musculoskeletal: Normal range of motion.  Integumentary: Warm, Dry, Intact, No suspicious lesions or rashes.  Extremities: No clubbing, cyanosis or edema  Neurologic: No focal deficits, Cranial Nerves II-XII are grossly intact, Motor strength normal upper and lower extremities, Sensory exam intact.  Psychiatric: Normal interaction, Coherent speech, Euthymic mood, Appropriate affect       Assessment:       ICD-10-CM ICD-9-CM   1. Medicare annual wellness visit, subsequent  Z00.00 V70.0   2. Screening PSA (prostate specific antigen)  Z12.5 V76.44   3. Other specified hypothyroidism  E03.8 244.8   4. Primary hypertension  I10 " 401.9   5. Essential familial hyperlipidemia  E78.49 272.2   6. Overweight (BMI 25.0-29.9)  E66.3 278.02   7. Male hypogonadism  E29.1 257.2        Plan:       Medicare Annual Wellness and Personalized Prevention Plan:     Fall Risk + Home Safety + Hearing Impairment + Depression Screen + Cognitive Impairment Screen + Health Risk Assessment all reviewed.          No data to display                  5/23/2025     9:06 AM 4/23/2025     8:10 AM 1/16/2025     8:58 AM 10/16/2024     8:08 AM 9/19/2024     2:49 PM 7/25/2024    11:09 AM 5/21/2024     8:52 AM   Depression Screeening PHQ2   Over the last two weeks how often have you been bothered by little interest or pleasure in doing things 0 0 0 0 0 0 0   Over the last two weeks how often have you been bothered by feeling down, depressed or hopeless 0 0 0 0 0 0 0   PHQ-2 Total Score 0 0 0 0 0 0 0         5/23/2025     9:15 AM 1/16/2025     9:00 AM 10/16/2024     8:15 AM 9/19/2024     3:00 PM 7/25/2024    10:45 AM 5/21/2024     9:00 AM 2/6/2024     8:45 AM   Fall Risk Assessment - Outpatient   Mobility Status Ambulatory Ambulatory Ambulatory Ambulatory Ambulatory Ambulatory Ambulatory   Number of falls 0 0 0 0 0 0 0   Identified as fall risk False False False False False False False                               Alcohol/Tobacco Use - Stressed importance of smoking cessation and limiting alcohol intake.  CVD Risk Factors - Reviewed  Obesity/Physical Activity - Normal BMI. Encouraged daily 30 minute physical activity x 5 days per week.      Health Maintenance Topics with due status: Not Due       Topic Last Completion Date    TETANUS VACCINE 09/19/2018    Colorectal Cancer Screening 02/08/2023    Hemoglobin A1c (Diabetic Prevention Screening) 05/21/2025    Lipid Panel 05/21/2025        Vaccinations -   Immunization History   Administered Date(s) Administered    COVID-19, MRNA, LN-S, PF (Pfizer) (Gray Cap) 04/05/2022    COVID-19, MRNA, LN-S, PF (Pfizer) (Purple Cap)  02/26/2021, 03/19/2021, 09/29/2021    Influenza 11/15/2022    Influenza (FLUAD) - Quadrivalent - Adjuvanted - PF *Preferred* (65+) 10/09/2023    Influenza - Quadrivalent - High Dose - PF (65 years and older) 09/26/2021, 11/14/2022    Influenza - Quadrivalent - PF *Preferred* (6 months and older) 11/05/2014, 02/08/2018    Influenza - Trivalent - Fluad - Adjuvanted - PF (65 years and older 09/19/2024    Influenza - Trivalent - Fluarix, Flulaval, Fluzone, Afluria - PF 11/05/2014, 10/17/2016, 02/08/2018, 09/19/2018, 11/19/2019, 09/23/2020, 09/26/2021    Pneumococcal Conjugate - 13 Valent 05/14/2020    Pneumococcal Polysaccharide - 23 Valent 07/23/2021    Tdap 09/19/2018    Zoster Recombinant 01/22/2021, 07/23/2021          1. Medicare annual wellness visit, subsequent  - Diet, exercise, and 10% weight loss encouraged. Keep appointment for dental exams x q6 months as scheduled. Keep appointment for annual eye exam as scheduled. Keep appointment with specialists as scheduled. Notify M.D. or ER if temp greater than 100.4, or any acute illness.      2. Screening PSA (prostate specific antigen)  - PSA, Screening; Future    3. Other specified hypothyroidism  - Well controlled, continue Synthroid as prescribed, recheck TSH, free T4 in 11/2025.     4. Primary hypertension  - BP is well controlled. Continue BP Rx HCTZ as prescribed. Keep daily BP log. Continue followup with Cardiology as scheduled. Notify M.D. or ER if BP >170/100 or <90/60, chest pain, palpitations, headache, SOB, temp greater than 100.4, or any acute illness.   Continue  Low Sodium Diet (DASH Diet - Less than 2 grams of sodium per day).  Monitor blood pressure daily and log. Report consistent numbers greater than 140/90.  Smoking cessation encouraged to aid in BP reduction.  Maintain healthy weight with goal BMI <30. Exercise 30 minutes per day, 5 days per week.     5. Essential familial hyperlipidemia  - Well controlled. Continue Rx Lipitor as prescribed,  recheck CMP, FLP, CPK in 05/2026.  Continue  Stressed importance of dietary modifications. Follow a low cholesterol, low saturated fat diet with less that 200mg of cholesterol a day.  Avoid fried foods and high saturated fats (high saturated fats less than 7% of calories).  Add Flax Seed/Fish Oil supplements to diet. Increase dietary fiber.  Regular exercise can reduce LDL and raise HDL. Stressed importance of physical activity 5 times per week for 30 minutes per day.    6. Overweight (BMI 25.0-29.9), was obese.   - tirzepatide, weight loss, (ZEPBOUND) 15 mg/0.5 mL PnIj; Inject 15 mg into the skin every 7 days.  Dispense: 2 mL; Refill: 5  - Diet, exercise, and 10% weight loss encouraged. Rx trial of increased dose of Zepbound to 15 mg weekly with close monitoring to help with insulin resistance and weight loss, Rx Zepbound refilled today. Will titrate Rx Zepbound as needed/tolerated until max dose achieved. Notify M.D. or ER if symptoms persist or worsen, adverse Rx side effects, pancreatitis, bilary colic, temp greater than 100.4, or any acute illness.    Body mass index is 28.76 kg/m².  Goal BMI <30.  Exercise 5 times a week for 30 minutes per day.  Avoid soda, simple sugars, excessive rice, potatoes or bread. Limit fast foods and fried foods.  Choose complex carbs in moderation (example: green vegetables, beans, oatmeal). Eat plenty of fresh fruits and vegetables with lean meats daily.  Do not skip meals. Eat a balanced portion size.  Avoid fad diets. Consider permanent healthy life style changes.      7. Male hypogonadism  - Testosterone; Future; will treat pending results.         Medication List with Changes/Refills   New Medications    TIRZEPATIDE, WEIGHT LOSS, (ZEPBOUND) 15 MG/0.5 ML PNIJ    Inject 15 mg into the skin every 7 days.       Start Date: 5/23/2025 End Date: 11/19/2025   Current Medications    ATORVASTATIN (LIPITOR) 10 MG TABLET    Take 10 mg by mouth every evening.       Start Date: 4/23/2022 End  Date: --    CIPROFLOXACIN HCL (CIPRO) 500 MG TABLET    Take 500 mg by mouth 2 (two) times daily.       Start Date: 11/27/2022End Date: --    DICLOFENAC (VOLTAREN) 75 MG EC TABLET    Take 75 mg by mouth 2 (two) times daily as needed.       Start Date: 1/25/2022 End Date: --    DIPHENOXYLATE-ATROPINE 2.5-0.025 MG (LOMOTIL) 2.5-0.025 MG PER TABLET    Take 1 tablet by mouth every 4 (four) hours as needed.       Start Date: 4/28/2025 End Date: --    FEBUXOSTAT (ULORIC) 80 MG TAB    Take 80 mg by mouth as needed.       Start Date: 2/18/2022 End Date: --    FLUTICASONE PROPIONATE (FLONASE) 50 MCG/ACTUATION NASAL SPRAY    SPRAY 2 SPRAYS BY EACH NOSTRIL ROUTE ONCE DAILY.       Start Date: 4/8/2025  End Date: --    FUROSEMIDE (LASIX) 40 MG TABLET    Take 1 tablet (40 mg total) by mouth daily as needed.       Start Date: 11/14/2024End Date: --    GABAPENTIN (NEURONTIN) 300 MG CAPSULE    Take 1 capsule (300 mg total) by mouth 3 (three) times daily.       Start Date: 1/15/2025 End Date: --    GUAIFENESIN (MUCINEX) 600 MG 12 HR TABLET    600 mg as needed.       Start Date: --        End Date: --    HYDROCHLOROTHIAZIDE (MICROZIDE) 12.5 MG CAPSULE    Take 12.5 mg by mouth.       Start Date: 9/27/2022 End Date: --    HYDROXYZINE HCL (ATARAX) 25 MG TABLET    TAKE 1 TABLET BY MOUTH AT BEDTIME AS NEEDED FOR INSOMNIA       Start Date: 9/25/2023 End Date: --    LEVOTHYROXINE (SYNTHROID) 25 MCG TABLET    Take 1 tablet (25 mcg total) by mouth before breakfast.       Start Date: 5/19/2025 End Date: --    LORATADINE (CLARITIN) 10 MG TABLET    Take 10 mg by mouth daily as needed.       Start Date: 10/4/2021 End Date: --    PANTOPRAZOLE (PROTONIX) 40 MG TABLET    Take 40 mg by mouth once daily.       Start Date: 6/5/2022  End Date: --   Discontinued Medications    TIRZEPATIDE, WEIGHT LOSS, (ZEPBOUND) 12.5 MG/0.5 ML PNIJ    Inject 12.5 mg into the skin every 7 days.       Start Date: 5/19/2025 End Date: 5/23/2025          The patient's Health  Maintenance was reviewed and the following appears to be due at this time:   Health Maintenance Due   Topic Date Due    PROSTATE-SPECIFIC ANTIGEN  05/15/2025         Follow up in about 3 months (around 8/23/2025) for Weight management followup.          [1]   Social History  Socioeconomic History    Marital status:    Tobacco Use    Smoking status: Never    Smokeless tobacco: Never   Substance and Sexual Activity    Alcohol use: Never    Drug use: Never    Sexual activity: Yes     Partners: Female     Social Drivers of Health     Financial Resource Strain: Low Risk  (2/4/2025)    Overall Financial Resource Strain (CARDIA)     Difficulty of Paying Living Expenses: Not hard at all   Food Insecurity: No Food Insecurity (2/4/2025)    Hunger Vital Sign     Worried About Running Out of Food in the Last Year: Never true     Ran Out of Food in the Last Year: Never true   Transportation Needs: No Transportation Needs (1/31/2024)    PRAPARE - Transportation     Lack of Transportation (Medical): No     Lack of Transportation (Non-Medical): No   Physical Activity: Unknown (2/4/2025)    Exercise Vital Sign     Days of Exercise per Week: 2 days   Recent Concern: Physical Activity - Insufficiently Active (2/4/2025)    Exercise Vital Sign     Days of Exercise per Week: 2 days     Minutes of Exercise per Session: 30 min   Stress: No Stress Concern Present (2/4/2025)    [employer] - Occupational Stress Questionnaire     Feeling of Stress : Only a little   Housing Stability: Low Risk  (1/31/2024)    Housing Stability Vital Sign     Unable to Pay for Housing in the Last Year: No     Number of Places Lived in the Last Year: 1     Unstable Housing in the Last Year: No

## 2025-05-30 LAB
PSA: 1.39
TESTOST SERPL-MCNC: 626 NG/DL

## 2025-06-02 ENCOUNTER — RESULTS FOLLOW-UP (OUTPATIENT)
Dept: FAMILY MEDICINE | Facility: CLINIC | Age: 70
End: 2025-06-02

## 2025-06-02 ENCOUNTER — DOCUMENTATION ONLY (OUTPATIENT)
Dept: FAMILY MEDICINE | Facility: CLINIC | Age: 70
End: 2025-06-02
Payer: MEDICARE

## 2025-06-03 ENCOUNTER — PATIENT MESSAGE (OUTPATIENT)
Dept: FAMILY MEDICINE | Facility: CLINIC | Age: 70
End: 2025-06-03
Payer: MEDICARE

## 2025-06-03 ENCOUNTER — DOCUMENTATION ONLY (OUTPATIENT)
Dept: FAMILY MEDICINE | Facility: CLINIC | Age: 70
End: 2025-06-03
Payer: MEDICARE

## 2025-07-07 DIAGNOSIS — E66.3 OVERWEIGHT (BMI 25.0-29.9): ICD-10-CM

## 2025-07-07 RX ORDER — TIRZEPATIDE 15 MG/.5ML
15 INJECTION, SOLUTION SUBCUTANEOUS
Qty: 2 ML | Refills: 5 | Status: SHIPPED | OUTPATIENT
Start: 2025-07-07 | End: 2026-01-03

## 2025-08-11 DIAGNOSIS — E66.3 OVERWEIGHT (BMI 25.0-29.9): ICD-10-CM

## 2025-08-11 RX ORDER — TIRZEPATIDE 15 MG/.5ML
15 INJECTION, SOLUTION SUBCUTANEOUS
Qty: 2 ML | Refills: 5 | Status: SHIPPED | OUTPATIENT
Start: 2025-08-11 | End: 2026-02-07

## 2025-08-18 ENCOUNTER — TELEPHONE (OUTPATIENT)
Dept: FAMILY MEDICINE | Facility: CLINIC | Age: 70
End: 2025-08-18
Payer: MEDICARE

## 2025-08-22 ENCOUNTER — OFFICE VISIT (OUTPATIENT)
Dept: FAMILY MEDICINE | Facility: CLINIC | Age: 70
End: 2025-08-22
Payer: MEDICARE

## 2025-08-22 VITALS
HEART RATE: 81 BPM | BODY MASS INDEX: 26.64 KG/M2 | SYSTOLIC BLOOD PRESSURE: 112 MMHG | DIASTOLIC BLOOD PRESSURE: 64 MMHG | HEIGHT: 73 IN | OXYGEN SATURATION: 96 % | TEMPERATURE: 98 F | RESPIRATION RATE: 16 BRPM | WEIGHT: 201 LBS

## 2025-08-22 DIAGNOSIS — E66.3 OVERWEIGHT (BMI 25.0-29.9): Primary | ICD-10-CM

## 2025-09-03 DIAGNOSIS — E66.3 OVERWEIGHT (BMI 25.0-29.9): ICD-10-CM

## 2025-09-03 RX ORDER — TIRZEPATIDE 15 MG/.5ML
15 INJECTION, SOLUTION SUBCUTANEOUS
Qty: 2 ML | Refills: 5 | Status: SHIPPED | OUTPATIENT
Start: 2025-09-03 | End: 2026-03-02